# Patient Record
Sex: FEMALE | Race: WHITE | HISPANIC OR LATINO | Employment: FULL TIME | URBAN - METROPOLITAN AREA
[De-identification: names, ages, dates, MRNs, and addresses within clinical notes are randomized per-mention and may not be internally consistent; named-entity substitution may affect disease eponyms.]

---

## 2017-09-22 ENCOUNTER — OFFICE VISIT (OUTPATIENT)
Dept: FAMILY MEDICINE | Facility: CLINIC | Age: 27
End: 2017-09-22
Payer: COMMERCIAL

## 2017-09-22 VITALS
WEIGHT: 117 LBS | OXYGEN SATURATION: 95 % | SYSTOLIC BLOOD PRESSURE: 101 MMHG | BODY MASS INDEX: 21.93 KG/M2 | DIASTOLIC BLOOD PRESSURE: 62 MMHG | RESPIRATION RATE: 14 BRPM | HEART RATE: 86 BPM | TEMPERATURE: 98.7 F

## 2017-09-22 DIAGNOSIS — L30.9 DERMATITIS: Primary | ICD-10-CM

## 2017-09-22 DIAGNOSIS — Z23 NEED FOR PROPHYLACTIC VACCINATION AND INOCULATION AGAINST INFLUENZA: ICD-10-CM

## 2017-09-22 PROCEDURE — 90471 IMMUNIZATION ADMIN: CPT | Performed by: FAMILY MEDICINE

## 2017-09-22 PROCEDURE — 99213 OFFICE O/P EST LOW 20 MIN: CPT | Mod: 25 | Performed by: FAMILY MEDICINE

## 2017-09-22 PROCEDURE — 90686 IIV4 VACC NO PRSV 0.5 ML IM: CPT | Performed by: FAMILY MEDICINE

## 2017-09-22 RX ORDER — CLOBETASOL PROPIONATE 0.5 MG/G
AEROSOL, FOAM TOPICAL
Qty: 100 G | Refills: 1 | Status: SHIPPED | OUTPATIENT
Start: 2017-09-22 | End: 2018-10-12

## 2017-09-22 RX ORDER — CLOBETASOL PROPIONATE 0.05 G/100ML
SHAMPOO TOPICAL DAILY PRN
Qty: 1 BOTTLE | Refills: 1 | Status: SHIPPED | OUTPATIENT
Start: 2017-09-22 | End: 2019-10-21

## 2017-09-22 NOTE — PROGRESS NOTES
SUBJECTIVE:   Shanta Edmondson is a 26 year old female who presents to clinic today for the following health issues:    Dry scalp  She's had many years of dandruff, so has used head and shoulders shampoo for years, then had much worse symptoms starting one year ago after treating herself with OVER THE COUNTER lice treatment consisting of 45 min cream treatement, and shampoo follow up, that seemed to work, but since then she has had recurrent red spots of her scalp.    She's tried different shampoos (neurogena), treating with tea tree oil.    Smptoms worsening over past few months, associated with very itchy scalp.      Problem list and histories reviewed & adjusted, as indicated.  Additional history: as documented    Patient Active Problem List   Diagnosis     CARDIOVASCULAR SCREENING; LDL GOAL LESS THAN 160     Gastroesophageal reflux disease without esophagitis     Restless leg syndrome     Past Surgical History:   Procedure Laterality Date     NO HISTORY OF SURGERY         Social History   Substance Use Topics     Smoking status: Never Smoker     Smokeless tobacco: Never Used     Alcohol use No      Comment: once a year      Family History   Problem Relation Age of Onset     DIABETES Father      Lipids Father      Hypertension Maternal Grandmother      DIABETES Paternal Grandfather          No Known Allergies  BP Readings from Last 3 Encounters:   09/22/17 101/62   11/04/16 94/71   09/29/15 104/70    Wt Readings from Last 3 Encounters:   09/22/17 117 lb (53.1 kg)   11/04/16 115 lb 8 oz (52.4 kg)   09/29/15 112 lb (50.8 kg)            Reviewed and updated as needed this visit by clinical staffTobacco  Allergies  Meds  Med Hx  Surg Hx  Fam Hx  Soc Hx      Reviewed and updated as needed this visit by Provider         ROS:  Constitutional, HEENT, cardiovascular, pulmonary, gi and gu systems are negative, except as otherwise noted.      OBJECTIVE:   /62  Pulse 86  Temp 98.7  F (37.1  C)  (Tympanic)  Resp 14  Wt 117 lb (53.1 kg)  LMP 09/15/2017  SpO2 95%  BMI 21.93 kg/m2  Body mass index is 21.93 kg/(m^2).  Patient is alert, cooperative, pleasant, in no acute distress.   Head: normocephalic, normal hair with normal distribution. Dry, scaly, slightly erythematous patches noted at hair line of forehead, behind ears, within hairline, with normal hair follicles and hair growth.    Diagnostic Test Results:  none     ASSESSMENT/PLAN:     1. Dermatitis  See orders, may use whatever is least expensive  Return to clinic if symptoms worsen or progress.     - clobetasol propionate 0.05 % SHAM; Apply topically daily as needed Apply sparingly to dry scalp once daily for 2 weeks, leave in place for 15 min, then add water, lather, rinse thoroughly.  Dispense: 1 Bottle; Refill: 1  - clobetasol propionate 0.05 % FOAM; Apply sparingly to scalp once daily for two weeks. Leave overnight and shower normally in the morning.  Do not apply to face.  Dispense: 100 g; Refill: 1    2. Need for prophylactic vaccination and inoculation against influenza  Done today  - FLU VAC, SPLIT VIRUS IM > 3 YO (QUADRIVALENT) [04676]  - Vaccine Administration, Initial [13712]    Deepika Peng MD  [facility]    Injectable Influenza Immunization Documentation    1.  Is the person to be vaccinated sick today?   No    2. Does the person to be vaccinated have an allergy to a component   of the vaccine?   No    3. Has the person to be vaccinated ever had a serious reaction   to influenza vaccine in the past?   No    4. Has the person to be vaccinated ever had Guillain-Barré syndrome?   No    Form completed by Dora Leal MA

## 2017-09-22 NOTE — MR AVS SNAPSHOT
After Visit Summary   9/22/2017    Shanta Edmondson    MRN: [id number]           Patient Information     Date Of Birth          1990        Visit Information        Provider Department      9/22/2017 4:00 PM Deepika Peng MD [facility]        Today's Diagnoses     Dermatitis    -  1    Need for prophylactic vaccination and inoculation against influenza           Follow-ups after your visit        Who to contact     If you have questions or need follow up information about today's clinic visit or your schedule please contact [facility] directly at [phone].  Normal or non-critical lab and imaging results will be communicated to you by MyChart, letter or phone within 4 business days after the clinic has received the results. If you do not hear from us within 7 days, please contact the clinic through [employer] or phone. If you have a critical or abnormal lab result, we will notify you by phone as soon as possible.  Submit refill requests through [employer] or call your pharmacy and they will forward the refill request to us. Please allow 3 business days for your refill to be completed.          Additional Information About Your Visit        MyChart Information     [employer] gives you secure access to your electronic health record. If you see a primary care provider, you can also send messages to your care team and make appointments. If you have questions, please call your primary care clinic.  If you do not have a primary care provider, please call [phone] and they will assist you.        Care EveryWhere ID     This is your Care EveryWhere ID. This could be used by other organizations to access your [place] medical records  [id number]        Your Vitals Were     Pulse Temperature Respirations Last Period Pulse Oximetry BMI (Body Mass Index)    86 98.7  F (37.1  C) (Tympanic) 14 09/15/2017 95% 21.93 kg/m2       Blood Pressure from Last 3  Encounters:   09/22/17 101/62   11/04/16 94/71   09/29/15 104/70    Weight from Last 3 Encounters:   09/22/17 117 lb (53.1 kg)   11/04/16 115 lb 8 oz (52.4 kg)   09/29/15 112 lb (50.8 kg)              We Performed the Following     FLU VAC, SPLIT VIRUS IM > 3 YO (QUADRIVALENT) [86760]     Vaccine Administration, Initial [98168]          Today's Medication Changes          These changes are accurate as of: 9/22/17 11:59 PM.  If you have any questions, ask your nurse or doctor.               Start taking these medicines.        Dose/Directions    * clobetasol propionate 0.05 % Sham   Used for:  Dermatitis   Started by:  Deepika Peng MD        Apply topically daily as needed Apply sparingly to dry scalp once daily for 2 weeks, leave in place for 15 min, then add water, lather, rinse thoroughly.   Quantity:  1 Bottle   Refills:  1       * clobetasol propionate 0.05 % Foam   Used for:  Dermatitis   Started by:  Deepika Peng MD        Apply sparingly to scalp once daily for two weeks. Leave overnight and shower normally in the morning.  Do not apply to face.   Quantity:  100 g   Refills:  1       * Notice:  This list has 2 medication(s) that are the same as other medications prescribed for you. Read the directions carefully, and ask your doctor or other care provider to review them with you.         Where to get your medicines      Some of these will need a paper prescription and others can be bought over the counter.  Ask your nurse if you have questions.     Bring a paper prescription for each of these medications     clobetasol propionate 0.05 % Foam    clobetasol propionate 0.05 % Sham                Primary Care Provider Office Phone # Fax #    Laney Todd PA-C [phone] [address]  [facility] [address]        Equal Access to Services     SUSANNE FORD AH: Valentino Wall, claus waller, sylvia mace  kuldip grantronytameka younger'aajaxson ah. So [facility] [phone].    ATENCIÓN: Si cameron duarte, tiene a vera disposición servicios gratuitos de asistencia lingüística. Maria G al [phone].    We comply with applicable federal civil rights laws and [place] laws. We do not discriminate on the basis of race, color, national origin, age, disability sex, sexual orientation or gender identity.            Thank you!     Thank you for choosing [facility]  for your care. Our goal is always to provide you with excellent care. Hearing back from our patients is one way we can continue to improve our services. Please take a few minutes to complete the written survey that you may receive in the mail after your visit with us. Thank you!             Your Updated Medication List - Protect others around you: Learn how to safely use, store and throw away your medicines at www.disposemymeds.org.          This list is accurate as of: 9/22/17 11:59 PM.  Always use your most recent med list.                   Brand Name Dispense Instructions for use Diagnosis    * clobetasol propionate 0.05 % Sham     1 Bottle    Apply topically daily as needed Apply sparingly to dry scalp once daily for 2 weeks, leave in place for 15 min, then add water, lather, rinse thoroughly.    Dermatitis       * clobetasol propionate 0.05 % Foam     100 g    Apply sparingly to scalp once daily for two weeks. Leave overnight and shower normally in the morning.  Do not apply to face.    Dermatitis       magnesium 250 MG tablet     100 tablet    Take 1 tablet by mouth daily    Restless leg syndrome       MULTIVITAMIN ADULT PO           omeprazole 20 MG tablet     60 tablet    Take 1 tablet (20 mg) by mouth daily Take 30-60 minutes before a meal and wait 30 minutes before eating after taking the medication.    Gastroesophageal reflux disease without esophagitis       * Notice:  This list has 2 medication(s) that are the same as other medications prescribed for you. Read  the directions carefully, and ask your doctor or other care provider to review them with you.

## 2017-09-22 NOTE — NURSING NOTE
Injectable Influenza Immunization Documentation    1.  Is the person to be vaccinated sick today?   No    2. Does the person to be vaccinated have an allergy to a component   of the vaccine?   No    3. Has the person to be vaccinated ever had a serious reaction   to influenza vaccine in the past?   No    4. Has the person to be vaccinated ever had Guillain-Barré syndrome?   No    Form completed by Dora Leal MA

## 2017-09-22 NOTE — NURSING NOTE
"Chief Complaint   Patient presents with     Derm Problem       Initial /62  Pulse 86  Temp 98.7  F (37.1  C) (Tympanic)  Resp 14  Wt 117 lb (53.1 kg)  LMP 09/15/2017  SpO2 95%  BMI 21.93 kg/m2 Estimated body mass index is 21.93 kg/(m^2) as calculated from the following:    Height as of 11/4/16: 5' 1.25\" (1.556 m).    Weight as of this encounter: 117 lb (53.1 kg).  Medication Reconciliation: complete     Dora Leal MA    "

## 2018-04-24 ENCOUNTER — OFFICE VISIT (OUTPATIENT)
Dept: DERMATOLOGY | Facility: CLINIC | Age: 28
End: 2018-04-24
Payer: COMMERCIAL

## 2018-04-24 VITALS — DIASTOLIC BLOOD PRESSURE: 76 MMHG | OXYGEN SATURATION: 98 % | HEART RATE: 90 BPM | SYSTOLIC BLOOD PRESSURE: 109 MMHG

## 2018-04-24 DIAGNOSIS — L70.0 ACNE VULGARIS: Primary | ICD-10-CM

## 2018-04-24 DIAGNOSIS — L21.9 DERMATITIS, SEBORRHEIC: ICD-10-CM

## 2018-04-24 PROCEDURE — 99214 OFFICE O/P EST MOD 30 MIN: CPT | Performed by: PHYSICIAN ASSISTANT

## 2018-04-24 RX ORDER — KETOCONAZOLE 20 MG/ML
SHAMPOO TOPICAL DAILY PRN
Qty: 120 ML | Refills: 11 | Status: SHIPPED | OUTPATIENT
Start: 2018-04-24 | End: 2019-10-21

## 2018-04-24 RX ORDER — TRETINOIN 0.5 MG/G
CREAM TOPICAL
Qty: 45 G | Refills: 11 | Status: SHIPPED | OUTPATIENT
Start: 2018-04-24 | End: 2019-10-21

## 2018-04-24 RX ORDER — DOXYCYCLINE 100 MG/1
CAPSULE ORAL
Qty: 60 CAPSULE | Refills: 2 | Status: SHIPPED | OUTPATIENT
Start: 2018-04-24 | End: 2018-10-12

## 2018-04-24 NOTE — LETTER
4/24/2018         RE: Shanta Edmondson  [address] [facility] [address]        Dear Colleague,    Thank you for referring your patient, Shanta Edmondson, to the [facility]. Please see a copy of my visit note below.    HPI:   Shanta Edmondson is a 27 year old female who presents for acne.  chief complaint  Condition has been present for: as long as she can remember, but worsening within the last several months. Seems to be present at all times now.   Pt complains of pain: Yes - cystic acne  Flares with menses.   Previous treatments include: Neutrogena products   Areas Involved: face  Current Outpatient Prescriptions   Medication Sig Dispense Refill     vitamin B complex with vitamin C (VITAMIN  B COMPLEX) TABS tablet Take 1 tablet by mouth daily       clobetasol propionate 0.05 % FOAM Apply sparingly to scalp once daily for two weeks. Leave overnight and shower normally in the morning.  Do not apply to face. (Patient not taking: Reported on 4/24/2018) 100 g 1     clobetasol propionate 0.05 % SHAM Apply topically daily as needed Apply sparingly to dry scalp once daily for 2 weeks, leave in place for 15 min, then add water, lather, rinse thoroughly. (Patient not taking: Reported on 4/24/2018) 1 Bottle 1     magnesium 250 MG tablet Take 1 tablet by mouth daily (Patient not taking: Reported on 9/22/2017) 100 tablet 3     Multiple Vitamins-Minerals (MULTIVITAMIN ADULT PO)        omeprazole 20 MG tablet Take 1 tablet (20 mg) by mouth daily Take 30-60 minutes before a meal and wait 30 minutes before eating after taking the medication. (Patient not taking: Reported on 9/22/2017) 60 tablet 1     No Known Allergies  Denies any other skin complaints, in general feels well: Yes  Review of symptoms otherwise negative:Yes    Social history: Lives in [place]. She works at [employer].     This document serves as a record of the services and decisions  personally performed and made by Radha Corona, MS, PATamraC. It was created on her behalf by Gina Bernstein, a trained medical scribe. The creation of this document is based on the provider's statements to the medical scribe.  Gina Bernstein 3:20 PM April 24, 2018    PHYSICAL EXAM:   A&Ox3: Yes   Well developed/well nourished female Yes   Mood appropriate Yes      /76  Pulse 90  LMP 04/10/2018  SpO2 98%  Breastfeeding? No  Type 3 skin. Mood appropriate  Alert and Oriented X 3. Well developed, well nourished in no distress.  General appearance: Normal  Head including face: Normal  Eyes: conjunctiva and lids: Normal  Mouth: Lips, teeth, gums: Normal  Neck: Normal  Back: clear  Cardiovascular: Exam of peripheral vascular system by observation for swelling, varicosities, edema: Normal  Extremities: digits/nails (clubbing): Normal  Right upper extremity: Normal  Left upper extremity: Normal  Right lower extremity: Normal  Left lower extremity: Normal  Skin: Scalp and body hair: See below     Comedones Papules/Pustules Cysts Staining Scarring   Face/Neck 2+ 2+ jawline 0 1+ 0   Chest 0 0 0 0 0   Back 0 0 0 0 0     Telangiectasias: No Fixed Erythema: No Exoriations: No   Other Physical Exam Findings:    ASSESSMENT & PLAN:   1. Acne Vulgaris - advised on diagnosis and treatment options. Tends to have combination skin. Discussed use of topical medications, antibiotics, OCP. Using Neutrogena acne scrub and lotion currently.   --Start doxycycline 100 mg BID x 3 months. Advised to take with food. Discussed risk of GI upset, esophagitis and photosensitivity.    --Start tretinoin 0.05% cream QD   --Gentle cleanser and moisturizer   2. Seborrheic dermatitis of scalp - advised on chronic, recurrent condition. Discussed that it is a reaction to the normal yeast on the skin. Has tried clobetasol shampoo in the past.   --Start ketoconazole shampoo daily as needed for itching and irritation    Pt advised on use and risks  including photosensitivity, allergic reactions, GI upset, headaches, nausea, erythema, scaling, vertigo, asthralgias, blood clots:Yes    Follow-up: 2 months  CC:   Scribed By: Gina Bernstein Medical Scribe    The information in this document, created by the medical scribe for me, accurately reflects the services I personally performed and the decisions made by me. I have reviewed and approved this document for accuracy prior to leaving the patient care area.  April 24, 2018 3:37 PM    Radha Corona MS, PATamraC        Again, thank you for allowing me to participate in the care of your patient.        Sincerely,        Radha Corona PA-C

## 2018-04-24 NOTE — NURSING NOTE
"Chief Complaint   Patient presents with     Acne       Initial /76  Pulse 90  LMP 04/10/2018  SpO2 98%  Breastfeeding? No Estimated body mass index is 21.93 kg/(m^2) as calculated from the following:    Height as of 11/4/16: 1.556 m (5' 1.25\").    Weight as of 9/22/17: 53.1 kg (117 lb).  Medication Reconciliation: complete    "

## 2018-04-24 NOTE — PATIENT INSTRUCTIONS
For acne:  Wash face with gentle cleanser one to two times daily -- Cetaphil, CeraVe, Neutrogena, Vanicream     Apply pea sized amount of tretinoin 0.05% cream to entire face once daily     Over tretinoin cream, apply moisturizer -- Cetaphil, CeraVe, Neutrogena, Vanicream     Start doxycycline 100 mg twice daily with food and water x 3 months.     For scalp:  Start ketoconazole shampoo daily as needed for itching and irritation       Follow up in 2 months for acne recheck

## 2018-04-24 NOTE — PROGRESS NOTES
HPI:   Shanta Edmondson is a 27 year old female who presents for acne.  chief complaint  Condition has been present for: as long as she can remember, but worsening within the last several months. Seems to be present at all times now.   Pt complains of pain: Yes - cystic acne  Flares with menses.   Previous treatments include: Neutrogena products   Areas Involved: face  Current Outpatient Prescriptions   Medication Sig Dispense Refill     vitamin B complex with vitamin C (VITAMIN  B COMPLEX) TABS tablet Take 1 tablet by mouth daily       clobetasol propionate 0.05 % FOAM Apply sparingly to scalp once daily for two weeks. Leave overnight and shower normally in the morning.  Do not apply to face. (Patient not taking: Reported on 4/24/2018) 100 g 1     clobetasol propionate 0.05 % SHAM Apply topically daily as needed Apply sparingly to dry scalp once daily for 2 weeks, leave in place for 15 min, then add water, lather, rinse thoroughly. (Patient not taking: Reported on 4/24/2018) 1 Bottle 1     magnesium 250 MG tablet Take 1 tablet by mouth daily (Patient not taking: Reported on 9/22/2017) 100 tablet 3     Multiple Vitamins-Minerals (MULTIVITAMIN ADULT PO)        omeprazole 20 MG tablet Take 1 tablet (20 mg) by mouth daily Take 30-60 minutes before a meal and wait 30 minutes before eating after taking the medication. (Patient not taking: Reported on 9/22/2017) 60 tablet 1     No Known Allergies  Denies any other skin complaints, in general feels well: Yes  Review of symptoms otherwise negative:Yes    Social history: Lives in [place]. She works at [employer].     This document serves as a record of the services and decisions personally performed and made by Radha Corona, MS, PA-C. It was created on her behalf by Gina Bernstein, a trained medical scribe. The creation of this document is based on the provider's statements to the medical scribe.  Gina Bernstein 3:20 PM April 24,  2018    PHYSICAL EXAM:   A&Ox3: Yes   Well developed/well nourished female Yes   Mood appropriate Yes      /76  Pulse 90  LMP 04/10/2018  SpO2 98%  Breastfeeding? No  Type 3 skin. Mood appropriate  Alert and Oriented X 3. Well developed, well nourished in no distress.  General appearance: Normal  Head including face: Normal  Eyes: conjunctiva and lids: Normal  Mouth: Lips, teeth, gums: Normal  Neck: Normal  Back: clear  Cardiovascular: Exam of peripheral vascular system by observation for swelling, varicosities, edema: Normal  Extremities: digits/nails (clubbing): Normal  Right upper extremity: Normal  Left upper extremity: Normal  Right lower extremity: Normal  Left lower extremity: Normal  Skin: Scalp and body hair: See below     Comedones Papules/Pustules Cysts Staining Scarring   Face/Neck 2+ 2+ jawline 0 1+ 0   Chest 0 0 0 0 0   Back 0 0 0 0 0     Telangiectasias: No Fixed Erythema: No Exoriations: No   Other Physical Exam Findings:    ASSESSMENT & PLAN:   1. Acne Vulgaris - advised on diagnosis and treatment options. Tends to have combination skin. Discussed use of topical medications, antibiotics, OCP. Using Neutrogena acne scrub and lotion currently.   --Start doxycycline 100 mg BID x 3 months. Advised to take with food. Discussed risk of GI upset, esophagitis and photosensitivity.    --Start tretinoin 0.05% cream QD   --Gentle cleanser and moisturizer   2. Seborrheic dermatitis of scalp - advised on chronic, recurrent condition. Discussed that it is a reaction to the normal yeast on the skin. Has tried clobetasol shampoo in the past.   --Start ketoconazole shampoo daily as needed for itching and irritation    Pt advised on use and risks including photosensitivity, allergic reactions, GI upset, headaches, nausea, erythema, scaling, vertigo, asthralgias, blood clots:Yes    Follow-up: 2 months  CC:   Scribed By: Gilda Velasco Scribe    The information in this document, created by the  medical scribe for me, accurately reflects the services I personally performed and the decisions made by me. I have reviewed and approved this document for accuracy prior to leaving the patient care area.  April 24, 2018 3:37 PM    Radha Corona MS, PA-C

## 2018-04-24 NOTE — MR AVS SNAPSHOT
After Visit Summary   4/24/2018    Shanta Edmondson    MRN: [id number]           Patient Information     Date Of Birth          1990        Visit Information        Provider Department      4/24/2018 3:15 PM Radha Corona PA-C [facility]        Today's Diagnoses     Acne vulgaris    -  1    Dermatitis, seborrheic          Care Instructions    For acne:  Wash face with gentle cleanser one to two times daily -- Cetaphil, CeraVe, Neutrogena, Vanicream     Apply pea sized amount of tretinoin 0.05% cream to entire face once daily     Over tretinoin cream, apply moisturizer -- Cetaphil, CeraVe, Neutrogena, Vanicream     Start doxycycline 100 mg twice daily with food and water x 3 months.     For scalp:  Start ketoconazole shampoo daily as needed for itching and irritation       Follow up in 2 months for acne recheck            Follow-ups after your visit        Follow-up notes from your care team     Return in about 2 months (around 6/24/2018) for recheck on acne.      Who to contact     If you have questions or need follow up information about today's clinic visit or your schedule please contact [facility] directly at [phone].  Normal or non-critical lab and imaging results will be communicated to you by ExactFlathart, letter or phone within 4 business days after the clinic has received the results. If you do not hear from us within 7 days, please contact the clinic through ExactFlathart or phone. If you have a critical or abnormal lab result, we will notify you by phone as soon as possible.  Submit refill requests through Rennovia or call your pharmacy and they will forward the refill request to us. Please allow 3 business days for your refill to be completed.          Additional Information About Your Visit        MyChart Information     Rennovia gives you secure access to your electronic health record. If you see a primary care provider, you can  also send messages to your care team and make appointments. If you have questions, please call your primary care clinic.  If you do not have a primary care provider, please call [phone] and they will assist you.        Care EveryWhere ID     This is your Care EveryWhere ID. This could be used by other organizations to access your [place] medical records  [id number]        Your Vitals Were     Pulse Last Period Pulse Oximetry Breastfeeding?          90 04/10/2018 98% No         Blood Pressure from Last 3 Encounters:   04/24/18 109/76   09/22/17 101/62   11/04/16 94/71    Weight from Last 3 Encounters:   09/22/17 53.1 kg (117 lb)   11/04/16 52.4 kg (115 lb 8 oz)   09/29/15 50.8 kg (112 lb)              Today, you had the following     No orders found for display       Primary Care Provider Office Phone # Fax #    Laney Todd PA-C [phone]       [address] [place] [address]        Equal Access to Services     SUSANNE FORD : Hadii aad ku hadasho Soomaali, waaxda luqadaha, qaybta kaalmada adeegyada, waxay idiin hayaan kuldip palacios . So [facility] [phone].    ATENCIÓN: Si habla español, tiene a vera disposición servicios gratuitos de asistencia lingüística. [facility] [phone].    We comply with applicable federal civil rights laws and [place] laws. We do not discriminate on the basis of race, color, national origin, age, disability, sex, sexual orientation, or gender identity.            Thank you!     Thank you for choosing [facility]  for your care. Our goal is always to provide you with excellent care. Hearing back from our patients is one way we can continue to improve our services. Please take a few minutes to complete the written survey that you may receive in the mail after your visit with us. Thank you!             Your Updated Medication List - Protect others around you: Learn how to safely use, store and throw away your  medicines at www.disposemymeds.org.          This list is accurate as of 4/24/18  3:38 PM.  Always use your most recent med list.                   Brand Name Dispense Instructions for use Diagnosis    * clobetasol propionate 0.05 % Sham     1 Bottle    Apply topically daily as needed Apply sparingly to dry scalp once daily for 2 weeks, leave in place for 15 min, then add water, lather, rinse thoroughly.    Dermatitis       * clobetasol propionate 0.05 % Foam     100 g    Apply sparingly to scalp once daily for two weeks. Leave overnight and shower normally in the morning.  Do not apply to face.    Dermatitis       magnesium 250 MG tablet     100 tablet    Take 1 tablet by mouth daily    Restless leg syndrome       MULTIVITAMIN ADULT PO           omeprazole 20 MG tablet     60 tablet    Take 1 tablet (20 mg) by mouth daily Take 30-60 minutes before a meal and wait 30 minutes before eating after taking the medication.    Gastroesophageal reflux disease without esophagitis       vitamin B complex with vitamin C Tabs tablet      Take 1 tablet by mouth daily        * Notice:  This list has 2 medication(s) that are the same as other medications prescribed for you. Read the directions carefully, and ask your doctor or other care provider to review them with you.

## 2018-04-27 ENCOUNTER — TELEPHONE (OUTPATIENT)
Dept: DERMATOLOGY | Facility: CLINIC | Age: 28
End: 2018-04-27

## 2018-04-27 NOTE — TELEPHONE ENCOUNTER
Prior Authorization Retail Medication Request    Medication/Dose: Tretinoin 0.05% Cream 45GM  ICD code (if different than what is on RX):    Previously Tried and Failed:    Rationale:      Insurance Name:  [employer]  Insurance ID:  [id number]       Pharmacy Information (if different than what is on RX)  Name:    Phone: *

## 2018-05-01 NOTE — TELEPHONE ENCOUNTER
Prior Authorization Approval    Authorization Effective Date: 5/1/2018  Authorization Expiration Date: 5/1/2019  Medication: TRETINOIN-APPROVED  Approved Dose/Quantity:    Reference #: [id number]   Insurance Company: [employer] ([facility]) - Phone [phone] Fax [phone]  Expected CoPay: $85.19     CoPay Card Available:      Foundation Assistance Needed:    Which Pharmacy is filling the prescription (Not needed for infusion/clinic administered): [email or web address] [address] [place], [place] [address] AVE AT [address]  Pharmacy Notified: Yes  Patient Notified: Yes

## 2018-05-01 NOTE — TELEPHONE ENCOUNTER
Central Prior Authorization Team   Phone: [phone]    PA Initiation    Medication:   Insurance Company: [employer] ([facility]) - Phone [phone] Fax [phone]  Pharmacy Filling the Rx: [facility] [id number] [place] [address] [address] AT [address]  Filling Pharmacy Phone: [phone]  Filling Pharmacy Fax: [phone]  Start Date: 5/1/2018

## 2018-07-20 ENCOUNTER — OFFICE VISIT (OUTPATIENT)
Dept: DERMATOLOGY | Facility: CLINIC | Age: 28
End: 2018-07-20
Payer: COMMERCIAL

## 2018-07-20 VITALS — DIASTOLIC BLOOD PRESSURE: 75 MMHG | HEART RATE: 93 BPM | SYSTOLIC BLOOD PRESSURE: 103 MMHG | OXYGEN SATURATION: 97 %

## 2018-07-20 DIAGNOSIS — L70.0 ACNE VULGARIS: Primary | ICD-10-CM

## 2018-07-20 DIAGNOSIS — L21.9 DERMATITIS, SEBORRHEIC: ICD-10-CM

## 2018-07-20 PROCEDURE — 99212 OFFICE O/P EST SF 10 MIN: CPT | Performed by: PHYSICIAN ASSISTANT

## 2018-07-20 NOTE — PROGRESS NOTES
HPI:   Shanta Edmondson is a 27 year old female who presents for acne. Doing well on current regimen  chief complaint  Condition has been present for: as long as she can remember, but worsening within the last several months. Seems to be present at all times now.   Pt complains of pain: Yes - cystic acne  Flares with menses.   Previous treatments include: Neutrogena products   Areas Involved: face  Current Outpatient Prescriptions   Medication Sig Dispense Refill     doxycycline monohydrate 100 MG capsule 1 tab BID with food and water x 3 months 60 capsule 2     ketoconazole (NIZORAL) 2 % shampoo Apply topically daily as needed for itching or irritation 120 mL 11     tretinoin (RETIN-A) 0.05 % cream Spread a pea size amount into affected area topically at bedtime.  Use sunscreen SPF>20. 45 g 11     clobetasol propionate 0.05 % FOAM Apply sparingly to scalp once daily for two weeks. Leave overnight and shower normally in the morning.  Do not apply to face. (Patient not taking: Reported on 4/24/2018) 100 g 1     clobetasol propionate 0.05 % SHAM Apply topically daily as needed Apply sparingly to dry scalp once daily for 2 weeks, leave in place for 15 min, then add water, lather, rinse thoroughly. (Patient not taking: Reported on 4/24/2018) 1 Bottle 1     magnesium 250 MG tablet Take 1 tablet by mouth daily (Patient not taking: Reported on 9/22/2017) 100 tablet 3     Multiple Vitamins-Minerals (MULTIVITAMIN ADULT PO)        omeprazole 20 MG tablet Take 1 tablet (20 mg) by mouth daily Take 30-60 minutes before a meal and wait 30 minutes before eating after taking the medication. (Patient not taking: Reported on 9/22/2017) 60 tablet 1     vitamin B complex with vitamin C (VITAMIN  B COMPLEX) TABS tablet Take 1 tablet by mouth daily       No Known Allergies  Denies any other skin complaints, in general feels well: Yes  Review of symptoms otherwise negative:Yes    Social history: Lives in [facility]. She works  at [employer].     This document serves as a record of the services and decisions personally performed and made by Radha Corona, MS, PA-C. It was created on her behalf by Gina Bernstein, a trained medical scribe. The creation of this document is based on the provider's statements to the medical scribe.  Gina Bernstein 3:20 PM April 24, 2018    PHYSICAL EXAM:   A&Ox3: Yes   Well developed/well nourished female Yes   Mood appropriate Yes      /75  Pulse 93  LMP 07/14/2018  SpO2 97%  Breastfeeding? No  Type 3 skin. Mood appropriate  Alert and Oriented X 3. Well developed, well nourished in no distress.  General appearance: Normal  Head including face: Normal  Eyes: conjunctiva and lids: Normal  Mouth: Lips, teeth, gums: Normal  Neck: Normal  Back: clear  Cardiovascular: Exam of peripheral vascular system by observation for swelling, varicosities, edema: Normal  Extremities: digits/nails (clubbing): Normal  Right upper extremity: Normal  Left upper extremity: Normal  Right lower extremity: Normal  Left lower extremity: Normal  Skin: Scalp and body hair: See below     Comedones Papules/Pustules Cysts Staining Scarring   Face/Neck 0-1+ 0 0 2+ 0   Chest 0 0 0 0 0   Back 0 0 0 0 0     Telangiectasias: No Fixed Erythema: No Exoriations: No   Other Physical Exam Findings:    ASSESSMENT & PLAN:   1. Acne Vulgaris - Doing much better! PIH only today. Using tretinoin every day. advised on diagnosis and treatment options. Tends to have combination skin. Discussed use of topical medications, antibiotics, OCP. If she flares after stopping doxy, dicussed increasing to tretinoin 0.1, repeat abx, OCPs, accutane.    --Finish doxycycline then stop  --Continue tretinoin 0.05% cream QD   --Gentle cleanser and moisturizer QD  2. Seborrheic dermatitis of scalp - Improved. advised on chronic, recurrent condition. Discussed that it is a reaction to the normal yeast on the skin.  --Continue ketoconazole shampoo  daily as needed for itching and irritation    Pt advised on use and risks including photosensitivity, allergic reactions, GI upset, headaches, nausea, erythema, scaling, vertigo, asthralgias, blood clots:Yes    Follow-up: 2 months  CC:   Scribed By: Gina Bernstein Medical Scribe    The information in this document, created by the medical scribe for me, accurately reflects the services I personally performed and the decisions made by me. I have reviewed and approved this document for accuracy prior to leaving the patient care area.  April 24, 2018 3:37 PM    Radha Corona MS, PA-C

## 2018-07-20 NOTE — LETTER
7/20/2018         RE: Shanta Edmondson  [address] [facility] [address]        Dear Colleague,    Thank you for referring your patient, Shanta Edmondson, to the [facility]. Please see a copy of my visit note below.    HPI:   Shanta Edmondson is a 27 year old female who presents for acne. Doing well on current regimen  chief complaint  Condition has been present for: as long as she can remember, but worsening within the last several months. Seems to be present at all times now.   Pt complains of pain: Yes - cystic acne  Flares with menses.   Previous treatments include: Neutrogena products   Areas Involved: face  Current Outpatient Prescriptions   Medication Sig Dispense Refill     doxycycline monohydrate 100 MG capsule 1 tab BID with food and water x 3 months 60 capsule 2     ketoconazole (NIZORAL) 2 % shampoo Apply topically daily as needed for itching or irritation 120 mL 11     tretinoin (RETIN-A) 0.05 % cream Spread a pea size amount into affected area topically at bedtime.  Use sunscreen SPF>20. 45 g 11     clobetasol propionate 0.05 % FOAM Apply sparingly to scalp once daily for two weeks. Leave overnight and shower normally in the morning.  Do not apply to face. (Patient not taking: Reported on 4/24/2018) 100 g 1     clobetasol propionate 0.05 % SHAM Apply topically daily as needed Apply sparingly to dry scalp once daily for 2 weeks, leave in place for 15 min, then add water, lather, rinse thoroughly. (Patient not taking: Reported on 4/24/2018) 1 Bottle 1     magnesium 250 MG tablet Take 1 tablet by mouth daily (Patient not taking: Reported on 9/22/2017) 100 tablet 3     Multiple Vitamins-Minerals (MULTIVITAMIN ADULT PO)        omeprazole 20 MG tablet Take 1 tablet (20 mg) by mouth daily Take 30-60 minutes before a meal and wait 30 minutes before eating after taking the medication. (Patient not taking: Reported on 9/22/2017) 60 tablet 1      vitamin B complex with vitamin C (VITAMIN  B COMPLEX) TABS tablet Take 1 tablet by mouth daily       No Known Allergies  Denies any other skin complaints, in general feels well: Yes  Review of symptoms otherwise negative:Yes    Social history: Lives in [facility]. She works at [employer].     This document serves as a record of the services and decisions personally performed and made by Radha Corona, MS, PA-C. It was created on her behalf by Gina Bernstein, a trained medical scribe. The creation of this document is based on the provider's statements to the medical scribe.  Gina Bernstein 3:20 PM April 24, 2018    PHYSICAL EXAM:   A&Ox3: Yes   Well developed/well nourished female Yes   Mood appropriate Yes      /75  Pulse 93  LMP 07/14/2018  SpO2 97%  Breastfeeding? No  Type 3 skin. Mood appropriate  Alert and Oriented X 3. Well developed, well nourished in no distress.  General appearance: Normal  Head including face: Normal  Eyes: conjunctiva and lids: Normal  Mouth: Lips, teeth, gums: Normal  Neck: Normal  Back: clear  Cardiovascular: Exam of peripheral vascular system by observation for swelling, varicosities, edema: Normal  Extremities: digits/nails (clubbing): Normal  Right upper extremity: Normal  Left upper extremity: Normal  Right lower extremity: Normal  Left lower extremity: Normal  Skin: Scalp and body hair: See below     Comedones Papules/Pustules Cysts Staining Scarring   Face/Neck 0-1+ 0 0 2+ 0   Chest 0 0 0 0 0   Back 0 0 0 0 0     Telangiectasias: No Fixed Erythema: No Exoriations: No   Other Physical Exam Findings:    ASSESSMENT & PLAN:   1. Acne Vulgaris - Doing much better! PIH only today. Using tretinoin every day. advised on diagnosis and treatment options. Tends to have combination skin. Discussed use of topical medications, antibiotics, OCP. If she flares after stopping doxy, dicussed increasing to tretinoin 0.1, repeat abx, OCPs, accutane.    --Finish  doxycycline then stop  --Continue tretinoin 0.05% cream QD   --Gentle cleanser and moisturizer QD  2. Seborrheic dermatitis of scalp - Improved. advised on chronic, recurrent condition. Discussed that it is a reaction to the normal yeast on the skin.  --Continue ketoconazole shampoo daily as needed for itching and irritation    Pt advised on use and risks including photosensitivity, allergic reactions, GI upset, headaches, nausea, erythema, scaling, vertigo, asthralgias, blood clots:Yes    Follow-up: 2 months  CC:   Scribed By: Gina Bernstein Medical Scribe    The information in this document, created by the medical scribe for me, accurately reflects the services I personally performed and the decisions made by me. I have reviewed and approved this document for accuracy prior to leaving the patient care area.  April 24, 2018 3:37 PM    Radha Corona MS, ALEX        Again, thank you for allowing me to participate in the care of your patient.        Sincerely,        Radha Corona PA-C

## 2018-10-12 ENCOUNTER — RADIANT APPOINTMENT (OUTPATIENT)
Dept: GENERAL RADIOLOGY | Facility: CLINIC | Age: 28
End: 2018-10-12
Attending: PHYSICIAN ASSISTANT
Payer: COMMERCIAL

## 2018-10-12 ENCOUNTER — OFFICE VISIT (OUTPATIENT)
Dept: FAMILY MEDICINE | Facility: CLINIC | Age: 28
End: 2018-10-12
Payer: COMMERCIAL

## 2018-10-12 VITALS
RESPIRATION RATE: 18 BRPM | OXYGEN SATURATION: 99 % | SYSTOLIC BLOOD PRESSURE: 113 MMHG | DIASTOLIC BLOOD PRESSURE: 76 MMHG | TEMPERATURE: 98.7 F | HEIGHT: 62 IN | BODY MASS INDEX: 23.37 KG/M2 | WEIGHT: 127 LBS | HEART RATE: 83 BPM

## 2018-10-12 DIAGNOSIS — R10.84 ABDOMINAL PAIN, GENERALIZED: ICD-10-CM

## 2018-10-12 DIAGNOSIS — K59.00 CONSTIPATION, UNSPECIFIED CONSTIPATION TYPE: ICD-10-CM

## 2018-10-12 DIAGNOSIS — Z23 NEED FOR PROPHYLACTIC VACCINATION AND INOCULATION AGAINST INFLUENZA: ICD-10-CM

## 2018-10-12 DIAGNOSIS — K21.9 GASTROESOPHAGEAL REFLUX DISEASE WITHOUT ESOPHAGITIS: ICD-10-CM

## 2018-10-12 DIAGNOSIS — Z00.00 ROUTINE GENERAL MEDICAL EXAMINATION AT A HEALTH CARE FACILITY: Primary | ICD-10-CM

## 2018-10-12 DIAGNOSIS — Z13.6 CARDIOVASCULAR SCREENING; LDL GOAL LESS THAN 160: ICD-10-CM

## 2018-10-12 LAB
ALBUMIN SERPL-MCNC: 4.1 G/DL (ref 3.4–5)
ALP SERPL-CCNC: 77 U/L (ref 40–150)
ALT SERPL W P-5'-P-CCNC: 14 U/L (ref 0–50)
ANION GAP SERPL CALCULATED.3IONS-SCNC: 7 MMOL/L (ref 3–14)
AST SERPL W P-5'-P-CCNC: 19 U/L (ref 0–45)
BASOPHILS # BLD AUTO: 0 10E9/L (ref 0–0.2)
BASOPHILS NFR BLD AUTO: 0.6 %
BILIRUB SERPL-MCNC: 0.6 MG/DL (ref 0.2–1.3)
BUN SERPL-MCNC: 9 MG/DL (ref 7–30)
CALCIUM SERPL-MCNC: 9.3 MG/DL (ref 8.5–10.1)
CHLORIDE SERPL-SCNC: 106 MMOL/L (ref 94–109)
CHOLEST SERPL-MCNC: 122 MG/DL
CO2 SERPL-SCNC: 26 MMOL/L (ref 20–32)
CREAT SERPL-MCNC: 0.55 MG/DL (ref 0.52–1.04)
DIFFERENTIAL METHOD BLD: NORMAL
EOSINOPHIL # BLD AUTO: 0.1 10E9/L (ref 0–0.7)
EOSINOPHIL NFR BLD AUTO: 0.9 %
ERYTHROCYTE [DISTWIDTH] IN BLOOD BY AUTOMATED COUNT: 13.4 % (ref 10–15)
GFR SERPL CREATININE-BSD FRML MDRD: >90 ML/MIN/1.7M2
GLUCOSE SERPL-MCNC: 76 MG/DL (ref 70–99)
HCT VFR BLD AUTO: 44 % (ref 35–47)
HDLC SERPL-MCNC: 47 MG/DL
HGB BLD-MCNC: 14.5 G/DL (ref 11.7–15.7)
LDLC SERPL CALC-MCNC: 64 MG/DL
LYMPHOCYTES # BLD AUTO: 1.7 10E9/L (ref 0.8–5.3)
LYMPHOCYTES NFR BLD AUTO: 31.1 %
MCH RBC QN AUTO: 30.7 PG (ref 26.5–33)
MCHC RBC AUTO-ENTMCNC: 33 G/DL (ref 31.5–36.5)
MCV RBC AUTO: 93 FL (ref 78–100)
MONOCYTES # BLD AUTO: 0.4 10E9/L (ref 0–1.3)
MONOCYTES NFR BLD AUTO: 7.4 %
NEUTROPHILS # BLD AUTO: 3.3 10E9/L (ref 1.6–8.3)
NEUTROPHILS NFR BLD AUTO: 60 %
NONHDLC SERPL-MCNC: 75 MG/DL
PLATELET # BLD AUTO: 268 10E9/L (ref 150–450)
POTASSIUM SERPL-SCNC: 3.8 MMOL/L (ref 3.4–5.3)
PROT SERPL-MCNC: 7.8 G/DL (ref 6.8–8.8)
RBC # BLD AUTO: 4.72 10E12/L (ref 3.8–5.2)
SODIUM SERPL-SCNC: 139 MMOL/L (ref 133–144)
TRIGL SERPL-MCNC: 56 MG/DL
TSH SERPL DL<=0.005 MIU/L-ACNC: 0.65 MU/L (ref 0.4–4)
WBC # BLD AUTO: 5.4 10E9/L (ref 4–11)

## 2018-10-12 PROCEDURE — 90686 IIV4 VACC NO PRSV 0.5 ML IM: CPT | Performed by: PHYSICIAN ASSISTANT

## 2018-10-12 PROCEDURE — 90471 IMMUNIZATION ADMIN: CPT | Performed by: PHYSICIAN ASSISTANT

## 2018-10-12 PROCEDURE — 74019 RADEX ABDOMEN 2 VIEWS: CPT

## 2018-10-12 PROCEDURE — 99395 PREV VISIT EST AGE 18-39: CPT | Mod: 25 | Performed by: PHYSICIAN ASSISTANT

## 2018-10-12 PROCEDURE — 84443 ASSAY THYROID STIM HORMONE: CPT | Performed by: PHYSICIAN ASSISTANT

## 2018-10-12 PROCEDURE — 84403 ASSAY OF TOTAL TESTOSTERONE: CPT | Performed by: PHYSICIAN ASSISTANT

## 2018-10-12 PROCEDURE — 36415 COLL VENOUS BLD VENIPUNCTURE: CPT | Performed by: PHYSICIAN ASSISTANT

## 2018-10-12 PROCEDURE — 85025 COMPLETE CBC W/AUTO DIFF WBC: CPT | Performed by: PHYSICIAN ASSISTANT

## 2018-10-12 PROCEDURE — 80061 LIPID PANEL: CPT | Performed by: PHYSICIAN ASSISTANT

## 2018-10-12 PROCEDURE — 80053 COMPREHEN METABOLIC PANEL: CPT | Performed by: PHYSICIAN ASSISTANT

## 2018-10-12 ASSESSMENT — ENCOUNTER SYMPTOMS
CONSTIPATION: 1
HEARTBURN: 1
ABDOMINAL PAIN: 1
WEAKNESS: 1

## 2018-10-12 NOTE — MR AVS SNAPSHOT
After Visit Summary   10/12/2018    Shanta Edmondson    MRN: [id number]           Patient Information     Date Of Birth          1990        Visit Information        Provider Department      10/12/2018 9:00 AM Laney Todd PA-C [facility]        Today's Diagnoses     Routine general medical examination at a health care facility    -  1    Constipation, unspecified constipation type        Abdominal pain, generalized        Gastroesophageal reflux disease without esophagitis        CARDIOVASCULAR SCREENING; LDL GOAL LESS THAN 160          Care Instructions    Trial of magnesium 200-400 mg nightly for the next few weeks.  If no improvement with above, prescription for Zantac 150 mg two times a day printed as next step.  Labs and stomach xray updated today.      Preventive Health Recommendations  Female Ages 21 to 25     Yearly exam:     See your health care provider every year in order to  o Review health changes.   o Discuss preventive care.    o Review your medicines if your doctor has prescribed any.      You should be tested each year for STDs (sexually transmitted diseases).       Talk to your provider about how often you should have cholesterol testing.      Get a Pap test every three years. If you have an abnormal result, your doctor may have you test more often.      If you are at risk for diabetes, you should have a diabetes test (fasting glucose).     Shots:     Get a flu shot each year.     Get a tetanus shot every 10 years.     Consider getting the shot (vaccine) that prevents cervical cancer (Gardasil).    Nutrition:     Eat at least 5 servings of fruits and vegetables each day.    Eat whole-grain bread, whole-wheat pasta and brown rice instead of white grains and rice.    Get adequate Calcium and Vitamin D.     Lifestyle    Exercise at least 150 minutes a week each week (30 minutes a day, 5 days a week). This will help you control your weight and  "prevent disease.    Limit alcohol to one drink per day.    No smoking.     Wear sunscreen to prevent skin cancer.    See your dentist every six months for an exam and cleaning.          Follow-ups after your visit        Follow-up notes from your care team     Return if symptoms worsen or fail to improve.      Future tests that were ordered for you today     Open Future Orders        Priority Expected Expires Ordered    XR Abdomen 2 Views Routine 10/12/2018 10/12/2019 10/12/2018            Who to contact     If you have questions or need follow up information about today's clinic visit or your schedule please contact [facility] directly at [phone].  Normal or non-critical lab and imaging results will be communicated to you by XO1hart, letter or phone within 4 business days after the clinic has received the results. If you do not hear from us within 7 days, please contact the clinic through XO1hart or phone. If you have a critical or abnormal lab result, we will notify you by phone as soon as possible.  Submit refill requests through Encision or call your pharmacy and they will forward the refill request to us. Please allow 3 business days for your refill to be completed.          Additional Information About Your Visit        XO1hart Information     [employer] gives you secure access to your electronic health record. If you see a primary care provider, you can also send messages to your care team and make appointments. If you have questions, please call your primary care clinic.  If you do not have a primary care provider, please call [phone] and they will assist you.        Care EveryWhere ID     This is your Care EveryWhere ID. This could be used by other organizations to access your [place] medical records  [id number]        Your Vitals Were     Pulse Temperature Respirations Height Last Period Pulse Oximetry    83 98.7  F (37.1  C) (Oral) 18 5' 1.5\" (1.562 m) 09/28/2018 99%    BMI (Body " Mass Index)                   23.61 kg/m2            Blood Pressure from Last 3 Encounters:   10/12/18 113/76   07/20/18 103/75   04/24/18 109/76    Weight from Last 3 Encounters:   10/12/18 127 lb (57.6 kg)   09/22/17 117 lb (53.1 kg)   11/04/16 115 lb 8 oz (52.4 kg)              We Performed the Following     CBC with platelets differential     Comprehensive metabolic panel     Lipid panel reflex to direct LDL Fasting     Testosterone, total     TSH with free T4 reflex          Today's Medication Changes          These changes are accurate as of 10/12/18  9:22 AM.  If you have any questions, ask your nurse or doctor.               Start taking these medicines.        Dose/Directions    ranitidine 150 MG tablet   Commonly known as:  ZANTAC   Used for:  Gastroesophageal reflux disease without esophagitis   Started by:  Laney Todd PA-C        Dose:  150 mg   Take 1 tablet (150 mg) by mouth 2 times daily   Quantity:  60 tablet   Refills:  1         These medicines have changed or have updated prescriptions.        Dose/Directions    clobetasol propionate 0.05 % Sham   This may have changed:  Another medication with the same name was removed. Continue taking this medication, and follow the directions you see here.   Used for:  Dermatitis   Changed by:  Laney Todd PA-C        Apply topically daily as needed Apply sparingly to dry scalp once daily for 2 weeks, leave in place for 15 min, then add water, lather, rinse thoroughly.   Quantity:  1 Bottle   Refills:  1         Stop taking these medicines if you haven't already. Please contact your care team if you have questions.     doxycycline monohydrate 100 MG capsule   Stopped by:  Laney Todd PA-C           magnesium 250 MG tablet   Stopped by:  Laney Todd PA-C           MULTIVITAMIN ADULT PO   Stopped by:  Laney Todd PA-C           omeprazole 20 MG tablet   Stopped by:  Laney Todd  ALEX           vitamin B complex with vitamin C Tabs tablet   Stopped by:  Laney Todd PA-C                Where to get your medicines      Some of these will need a paper prescription and others can be bought over the counter.  Ask your nurse if you have questions.     Bring a paper prescription for each of these medications     ranitidine 150 MG tablet                Primary Care Provider Office Phone # Fax #    Laney Todd PA-C [phone]       [address] [facility] [address]        Equal Access to Services     JENNIFER FORD : Hadii aad ku hadasho Soomaali, waaxda luqadaha, qaybta kaalmada adeegyada, waxay idiin hayaan adeeg kharatameka palacios . So [facility] [place] [phone].    ATENCIÓN: Si habla español, tiene a vera disposición servicios gratuitos de asistencia lingüística. [facility] [phone].    We comply with applicable federal civil rights laws and [place] laws. We do not discriminate on the basis of race, color, national origin, age, disability, sex, sexual orientation, or gender identity.            Thank you!     Thank you for choosing [facility]  for your care. Our goal is always to provide you with excellent care. Hearing back from our patients is one way we can continue to improve our services. Please take a few minutes to complete the written survey that you may receive in the mail after your visit with us. Thank you!             Your Updated Medication List - Protect others around you: Learn how to safely use, store and throw away your medicines at www.disposemymeds.org.          This list is accurate as of 10/12/18  9:22 AM.  Always use your most recent med list.                   Brand Name Dispense Instructions for use Diagnosis    clobetasol propionate 0.05 % Sham     1 Bottle    Apply topically daily as needed Apply sparingly to dry scalp once daily for 2 weeks, leave in place for 15 min, then add water, lather, rinse thoroughly.     Dermatitis       ketoconazole 2 % shampoo    NIZORAL    120 mL    Apply topically daily as needed for itching or irritation    Dermatitis, seborrheic       ranitidine 150 MG tablet    ZANTAC    60 tablet    Take 1 tablet (150 mg) by mouth 2 times daily    Gastroesophageal reflux disease without esophagitis       tretinoin 0.05 % cream    RETIN-A    45 g    Spread a pea size amount into affected area topically at bedtime.  Use sunscreen SPF>20.    Acne vulgaris

## 2018-10-12 NOTE — PROGRESS NOTES

## 2018-10-12 NOTE — PROGRESS NOTES
SUBJECTIVE:   CC: Shanta Edmondson is an 27 year old woman who presents for preventive health visit.     Physical   Annual:     Getting at least 3 servings of Calcium per day:  NO    Bi-annual eye exam:  NO    Dental care twice a year:  Yes    Sleep apnea or symptoms of sleep apnea:  Daytime drowsiness    Diet:  Regular (no restrictions)    Frequency of exercise:  2-3 days/week    Duration of exercise:  30-45 minutes    Taking medications regularly:  Not Applicable    Additional concerns today:  YES    Other:  -Stomach pain x few years - changed RLQ and epigastric pain over the past 2 weeks with cramp like sensation and achey/soreness; comes and goes sometimes with food then lasts for maybe 30 minutes; feels like it improves with stretching; regular BM every morning, maybe leans towards constipation at times. Water tends to make it worse; no specific foods though.  History of taking omeprazole in the past with some improvement; she has tried magnesium for restless leg in the past as well.    -Fatigue: wondering if related to above and/or has noticed her sleep schedule is off lately.    Today's PHQ-2 Score:   PHQ-2 ( 1999 Pfizer) 10/12/2018   Q1: Little interest or pleasure in doing things 2   Q2: Feeling down, depressed or hopeless 0   PHQ-2 Score 2   Q1: Little interest or pleasure in doing things More than half the days   Q2: Feeling down, depressed or hopeless Not at all   PHQ-2 Score 2       Abuse: Current or Past (Physical, Sexual or Emotional) - No  Do you feel safe in your environment - Yes    Social History   Substance Use Topics     Smoking status: Never Smoker     Smokeless tobacco: Never Used     Alcohol use No      Comment: once a year      Alcohol Use 10/12/2018   If you drink alcohol do you typically have greater than 3 drinks per day OR greater than 7 drinks per week? Not Applicable   No flowsheet data found.    Reviewed orders with patient.  Reviewed health maintenance and updated orders  accordingly - Yes  Labs reviewed in EPIC  BP Readings from Last 3 Encounters:   10/12/18 113/76   07/20/18 103/75   04/24/18 109/76    Wt Readings from Last 3 Encounters:   10/12/18 127 lb (57.6 kg)   09/22/17 117 lb (53.1 kg)   11/04/16 115 lb 8 oz (52.4 kg)                  Patient Active Problem List   Diagnosis     CARDIOVASCULAR SCREENING; LDL GOAL LESS THAN 160     Gastroesophageal reflux disease without esophagitis     Restless leg syndrome     Past Surgical History:   Procedure Laterality Date     NO HISTORY OF SURGERY         Social History   Substance Use Topics     Smoking status: Never Smoker     Smokeless tobacco: Never Used     Alcohol use No      Comment: once a year      Family History   Problem Relation Age of Onset     Diabetes Father      Lipids Father      Hypertension Maternal Grandmother      Diabetes Paternal Grandfather          Current Outpatient Prescriptions   Medication Sig Dispense Refill     clobetasol propionate 0.05 % SHAM Apply topically daily as needed Apply sparingly to dry scalp once daily for 2 weeks, leave in place for 15 min, then add water, lather, rinse thoroughly. 1 Bottle 1     ketoconazole (NIZORAL) 2 % shampoo Apply topically daily as needed for itching or irritation 120 mL 11     ranitidine (ZANTAC) 150 MG tablet Take 1 tablet (150 mg) by mouth 2 times daily 60 tablet 1     tretinoin (RETIN-A) 0.05 % cream Spread a pea size amount into affected area topically at bedtime.  Use sunscreen SPF>20. 45 g 11     No Known Allergies    Mammogram not appropriate for this patient based on age.    Pertinent mammograms are reviewed under the imaging tab.  History of abnormal Pap smear: NO - age 21-29 PAP every 3 years recommended     Reviewed and updated as needed this visit by clinical staff  Tobacco  Allergies  Meds  Problems  Med Hx  Surg Hx  Fam Hx  Soc Hx          Reviewed and updated as needed this visit by Provider  Allergies  Meds  Problems          Review of  "Systems   Gastrointestinal: Positive for abdominal pain, constipation and heartburn.   Neurological: Positive for weakness.      OBJECTIVE:   /76 (BP Location: Left arm, Patient Position: Sitting, Cuff Size: Adult Regular)  Pulse 83  Temp 98.7  F (37.1  C) (Oral)  Resp 18  Ht 5' 1.5\" (1.562 m)  Wt 127 lb (57.6 kg)  LMP 09/28/2018  SpO2 99%  BMI 23.61 kg/m2  Physical Exam  GENERAL: healthy, alert and no distress  EYES: Eyes grossly normal to inspection, PERRL and conjunctivae and sclerae normal  HENT: ear canals and TM's normal, nose and mouth without ulcers or lesions  NECK: no adenopathy, no asymmetry, masses, or scars and thyroid normal to palpation  RESP: lungs clear to auscultation - no rales, rhonchi or wheezes  CV: regular rate and rhythm, normal S1 S2, no S3 or S4, no murmur, click or rub, no peripheral edema and peripheral pulses strong  ABDOMEN: soft, nontender, no hepatosplenomegaly, no masses and bowel sounds normal  MS: no gross musculoskeletal defects noted, no edema  SKIN: no suspicious lesions or rashes  NEURO: Normal strength and tone, mentation intact and speech normal  PSYCH: mentation appears normal, affect normal/bright    Diagnostic Test Results:  none     ASSESSMENT/PLAN:       ICD-10-CM    1. Routine general medical examination at a health care facility Z00.00    2. Constipation, unspecified constipation type K59.00 TSH with free T4 reflex     XR Abdomen 2 Views     Testosterone, total   3. Abdominal pain, generalized R10.84 Comprehensive metabolic panel     CBC with platelets differential     XR Abdomen 2 Views   4. Gastroesophageal reflux disease without esophagitis K21.9 Comprehensive metabolic panel     CBC with platelets differential     ranitidine (ZANTAC) 150 MG tablet   5. CARDIOVASCULAR SCREENING; LDL GOAL LESS THAN 160 Z13.6 Lipid panel reflex to direct LDL Fasting   6. Need for prophylactic vaccination and inoculation against influenza Z23 FLU VACCINE, SPLIT VIRUS, IM " "(QUADRIVALENT) [29960]- >3 YRS     Vaccine Administration, Initial [48866]       COUNSELING:  Reviewed preventive health counseling, as reflected in patient instructions       Regular exercise       Healthy diet/nutrition       Vision screening    BP Readings from Last 1 Encounters:   10/12/18 113/76     Estimated body mass index is 23.61 kg/(m^2) as calculated from the following:    Height as of this encounter: 5' 1.5\" (1.562 m).    Weight as of this encounter: 127 lb (57.6 kg).     reports that she has never smoked. She has never used smokeless tobacco.      Counseling Resources:  ATP IV Guidelines  Pooled Cohorts Equation Calculator  Breast Cancer Risk Calculator  FRAX Risk Assessment  ICSI Preventive Guidelines  Dietary Guidelines for Americans, 2010  USDA's MyPlate  ASA Prophylaxis  Lung CA Screening    Laney Todd PA-C  [facility]    Answers for HPI/ROS submitted by the patient on 10/12/2018   PHQ-2 Score: 2    "

## 2018-10-12 NOTE — PATIENT INSTRUCTIONS
Trial of magnesium 200-400 mg nightly for the next few weeks.  If no improvement with above, prescription for Zantac 150 mg two times a day printed as next step.  Labs and stomach xray updated today.      Preventive Health Recommendations  Female Ages 21 to 25     Yearly exam:     See your health care provider every year in order to  o Review health changes.   o Discuss preventive care.    o Review your medicines if your doctor has prescribed any.      You should be tested each year for STDs (sexually transmitted diseases).       Talk to your provider about how often you should have cholesterol testing.      Get a Pap test every three years. If you have an abnormal result, your doctor may have you test more often.      If you are at risk for diabetes, you should have a diabetes test (fasting glucose).     Shots:     Get a flu shot each year.     Get a tetanus shot every 10 years.     Consider getting the shot (vaccine) that prevents cervical cancer (Gardasil).    Nutrition:     Eat at least 5 servings of fruits and vegetables each day.    Eat whole-grain bread, whole-wheat pasta and brown rice instead of white grains and rice.    Get adequate Calcium and Vitamin D.     Lifestyle    Exercise at least 150 minutes a week each week (30 minutes a day, 5 days a week). This will help you control your weight and prevent disease.    Limit alcohol to one drink per day.    No smoking.     Wear sunscreen to prevent skin cancer.    See your dentist every six months for an exam and cleaning.

## 2018-10-15 NOTE — PROGRESS NOTES
"Simeon Womack  Your attached xray is within normal limits but does comment on a decent amount of stool.  Please contact the office with any questions or concerns.    Laney Jaimes \"Ammon\" ALEX Todd  "

## 2018-10-16 LAB — TESTOST SERPL-MCNC: 42 NG/DL (ref 8–60)

## 2018-10-16 NOTE — PROGRESS NOTES
"Simeon Womack  Your attached labs are normal. Keep up the good work!  Please contact the office with any questions or concerns.    Laney Jaimes \"Ammon\" ALEX Todd  "

## 2019-09-07 ENCOUNTER — OFFICE VISIT (OUTPATIENT)
Dept: URGENT CARE | Facility: URGENT CARE | Age: 29
End: 2019-09-07
Payer: COMMERCIAL

## 2019-09-07 ENCOUNTER — APPOINTMENT (OUTPATIENT)
Dept: ULTRASOUND IMAGING | Facility: CLINIC | Age: 29
End: 2019-09-07
Attending: EMERGENCY MEDICINE
Payer: COMMERCIAL

## 2019-09-07 ENCOUNTER — HOSPITAL ENCOUNTER (EMERGENCY)
Facility: CLINIC | Age: 29
Discharge: HOME | End: 2019-09-07
Attending: EMERGENCY MEDICINE | Admitting: EMERGENCY MEDICINE
Payer: COMMERCIAL

## 2019-09-07 VITALS
SYSTOLIC BLOOD PRESSURE: 112 MMHG | DIASTOLIC BLOOD PRESSURE: 81 MMHG | TEMPERATURE: 98.7 F | OXYGEN SATURATION: 98 % | WEIGHT: 127 LBS | HEART RATE: 102 BPM | BODY MASS INDEX: 23.61 KG/M2

## 2019-09-07 VITALS
SYSTOLIC BLOOD PRESSURE: 112 MMHG | TEMPERATURE: 97.9 F | RESPIRATION RATE: 16 BRPM | HEIGHT: 61 IN | BODY MASS INDEX: 22.66 KG/M2 | OXYGEN SATURATION: 100 % | WEIGHT: 120 LBS | DIASTOLIC BLOOD PRESSURE: 58 MMHG

## 2019-09-07 DIAGNOSIS — B37.31 YEAST INFECTION OF THE VAGINA: ICD-10-CM

## 2019-09-07 DIAGNOSIS — R10.9 ABDOMINAL PAIN, UNSPECIFIED ABDOMINAL LOCATION: ICD-10-CM

## 2019-09-07 DIAGNOSIS — B37.31 CANDIDIASIS OF VAGINA: Primary | ICD-10-CM

## 2019-09-07 DIAGNOSIS — R10.31 RIGHT LOWER QUADRANT ABDOMINAL PAIN: ICD-10-CM

## 2019-09-07 LAB
ALBUMIN SERPL-MCNC: 3.7 G/DL (ref 3.4–5)
ALBUMIN UR-MCNC: NEGATIVE MG/DL
ALP SERPL-CCNC: 84 U/L (ref 40–150)
ALT SERPL W P-5'-P-CCNC: 11 U/L (ref 0–50)
ANION GAP SERPL CALCULATED.3IONS-SCNC: 5 MMOL/L (ref 3–14)
APPEARANCE UR: CLEAR
AST SERPL W P-5'-P-CCNC: 10 U/L (ref 0–45)
BACTERIA #/AREA URNS HPF: ABNORMAL /HPF
BASOPHILS # BLD AUTO: 0 10E9/L (ref 0–0.2)
BASOPHILS NFR BLD AUTO: 0.6 %
BILIRUB SERPL-MCNC: 0.6 MG/DL (ref 0.2–1.3)
BILIRUB UR QL STRIP: NEGATIVE
BUN SERPL-MCNC: 12 MG/DL (ref 7–30)
CALCIUM SERPL-MCNC: 8.9 MG/DL (ref 8.5–10.1)
CHLORIDE SERPL-SCNC: 107 MMOL/L (ref 94–109)
CO2 SERPL-SCNC: 28 MMOL/L (ref 20–32)
COLOR UR AUTO: YELLOW
CREAT SERPL-MCNC: 0.64 MG/DL (ref 0.52–1.04)
DIFFERENTIAL METHOD BLD: NORMAL
EOSINOPHIL # BLD AUTO: 0.1 10E9/L (ref 0–0.7)
EOSINOPHIL NFR BLD AUTO: 1.6 %
ERYTHROCYTE [DISTWIDTH] IN BLOOD BY AUTOMATED COUNT: 12.9 % (ref 10–15)
GFR SERPL CREATININE-BSD FRML MDRD: >90 ML/MIN/{1.73_M2}
GLUCOSE SERPL-MCNC: 85 MG/DL (ref 70–99)
GLUCOSE UR STRIP-MCNC: NEGATIVE MG/DL
HCG SERPL QL: NEGATIVE
HCT VFR BLD AUTO: 44.2 % (ref 35–47)
HGB BLD-MCNC: 14.8 G/DL (ref 11.7–15.7)
HGB UR QL STRIP: ABNORMAL
IMM GRANULOCYTES # BLD: 0 10E9/L (ref 0–0.4)
IMM GRANULOCYTES NFR BLD: 0 %
KETONES UR STRIP-MCNC: NEGATIVE MG/DL
LEUKOCYTE ESTERASE UR QL STRIP: NEGATIVE
LYMPHOCYTES # BLD AUTO: 1.9 10E9/L (ref 0.8–5.3)
LYMPHOCYTES NFR BLD AUTO: 37.4 %
MCH RBC QN AUTO: 30.8 PG (ref 26.5–33)
MCHC RBC AUTO-ENTMCNC: 33.5 G/DL (ref 31.5–36.5)
MCV RBC AUTO: 92 FL (ref 78–100)
MONOCYTES # BLD AUTO: 0.4 10E9/L (ref 0–1.3)
MONOCYTES NFR BLD AUTO: 8.2 %
NEUTROPHILS # BLD AUTO: 2.6 10E9/L (ref 1.6–8.3)
NEUTROPHILS NFR BLD AUTO: 52.2 %
NITRATE UR QL: NEGATIVE
NON-SQ EPI CELLS #/AREA URNS LPF: ABNORMAL /LPF
NRBC # BLD AUTO: 0 10*3/UL
NRBC BLD AUTO-RTO: 0 /100
PH UR STRIP: 6.5 PH (ref 5–7)
PLATELET # BLD AUTO: 300 10E9/L (ref 150–450)
POTASSIUM SERPL-SCNC: 3.6 MMOL/L (ref 3.4–5.3)
PROT SERPL-MCNC: 7.6 G/DL (ref 6.8–8.8)
RBC # BLD AUTO: 4.8 10E12/L (ref 3.8–5.2)
RBC #/AREA URNS AUTO: ABNORMAL /HPF
SODIUM SERPL-SCNC: 140 MMOL/L (ref 133–144)
SOURCE: ABNORMAL
SP GR UR STRIP: 1.02 (ref 1–1.03)
SPECIMEN SOURCE: ABNORMAL
UROBILINOGEN UR STRIP-ACNC: 0.2 EU/DL (ref 0.2–1)
WBC # BLD AUTO: 5 10E9/L (ref 4–11)
WBC #/AREA URNS AUTO: ABNORMAL /HPF
WET PREP SPEC: ABNORMAL

## 2019-09-07 PROCEDURE — 85025 COMPLETE CBC W/AUTO DIFF WBC: CPT | Performed by: EMERGENCY MEDICINE

## 2019-09-07 PROCEDURE — 80053 COMPREHEN METABOLIC PANEL: CPT | Performed by: EMERGENCY MEDICINE

## 2019-09-07 PROCEDURE — 99284 EMERGENCY DEPT VISIT MOD MDM: CPT | Mod: 25

## 2019-09-07 PROCEDURE — 99213 OFFICE O/P EST LOW 20 MIN: CPT | Performed by: INTERNAL MEDICINE

## 2019-09-07 PROCEDURE — 81001 URINALYSIS AUTO W/SCOPE: CPT | Performed by: INTERNAL MEDICINE

## 2019-09-07 PROCEDURE — 87210 SMEAR WET MOUNT SALINE/INK: CPT | Performed by: INTERNAL MEDICINE

## 2019-09-07 PROCEDURE — 93976 VASCULAR STUDY: CPT

## 2019-09-07 PROCEDURE — 84703 CHORIONIC GONADOTROPIN ASSAY: CPT | Performed by: EMERGENCY MEDICINE

## 2019-09-07 RX ORDER — FLUCONAZOLE 150 MG/1
150 TABLET ORAL ONCE
Qty: 1 TABLET | Refills: 0 | Status: SHIPPED | OUTPATIENT
Start: 2019-09-07 | End: 2019-10-21

## 2019-09-07 ASSESSMENT — ENCOUNTER SYMPTOMS
ARTHRALGIAS: 0
VOMITING: 0
NAUSEA: 1
DIARRHEA: 0
MYALGIAS: 0
CHILLS: 0
ABDOMINAL PAIN: 1
DYSURIA: 0
CONSTIPATION: 0
FEVER: 0

## 2019-09-07 ASSESSMENT — MIFFLIN-ST. JEOR: SCORE: 1221.7

## 2019-09-07 NOTE — PROGRESS NOTES
[facility] Progress Note        Mikhail Lau MD, MPH  09/07/2019        History:      A pleasant 28 year old year old female is seen accompanied by her mother for evaluation of the right lower quadrant abdominal pain that she has had for the past 5 days but worsened since last evening.  She refers to a history of constipation but did have a formed normal bowel movement this morning.  She has sensation of nausea but does not report any vomiting or diarrhea.  She states that she has felt unwell in the past 3 days but denies fever or chills.  She also wishes to be checked for UTI and vaginal infection although she denies any dysuria or any vaginal discharge at the present time.  She states that she is not sexually active.  No arthralgia or myalgias referred.  No rash.         Assessment and Plan:         UA reflex to Microscopic and Culture ([place] [facility] (except [place] and [place])    - Urine Microscopic      1. Right lower quadrant abdominal pain: Given the patient clinical presentation  And findings on exam the patient is directed to [facility] per her request for further evaluation and management plan.  Patient and her mother agree with this plan as stated.  She is advised to avoid drinking or eating until she is directed so by the emergency room medical staff.  Patient apart from the urgent care in a stable condition with stable vital signs.    -   2. Candidiasis of vagina  - Wet prep: Yeast were identified.  - fluconazole (DIFLUCAN) 150 MG tablet; Take 1 tablet (150 mg) by mouth once for 1 dose  Dispense: 1 tablet; Refill: 0  She is advised to follow-up with her primary care physician within 1 week for reevaluation.                   Physical Exam:      /81   Pulse 102   Temp 98.7  F (37.1  C) (Oral)   Wt 57.6 kg (127 lb)   LMP 09/06/2019   SpO2 98%   Breastfeeding? No   BMI 23.61 kg/m       Constitutional: Patient is in no distress The  patient is pleasant and cooperative.   HEENT: Head:  Head is atraumatic, normocephalic.    Eyes: Pupils are equal, round and reactive to light and accomodation.  Sclera is non-icteric. No conjunctival injection, or exudate noted. Extraocular motion is intact. Visual acuity is intact bilaterally.  Ears:  External acoustic canals are patent and clear.  There is no erythema and bulging( exudate)  of the ( R/L ) tympanic membrane(s ).   Nose:  No Nasal congestion w/o drainage or mucosal ulceration is noted.  Throat:  Oral mucosa is moist.  No oral lesions are noted.  No posterior pharyngeal hyperemia or exudate noted.     Neck Supple.  There is no cervical lymphadenopathy.  No nuchal rigidity noted.  There is no meningismus.     Cardiovascular: Heart is regular to rate and rhythm.  No murmur is noted.     Chest. Chest Symmetrical, no soft tissues, swelling, or tenderness upon palpation   Lungs: Clear in the anterior and posterior pulmonary fields.   Abdomen:  Right lower abdominal quadrant pain.  Bowel sounds are sluggish.  No abdominal distention is noted.   Back No flank tenderness is noted.   Extremeties No edema, no calf tenderness.   Neuro: No focal deficit.   Skin No petechiae or purpura is noted.  There is no rash.   Mood Normal              Medications:        PRN Meds:          Data:      All new lab and imaging data was reviewed.   Results for orders placed or performed in visit on 09/07/19   *UA reflex to Microscopic and Culture ([facility] (except [place] and [place])   Result Value Ref Range    Color Urine Yellow     Appearance Urine Clear     Glucose Urine Negative NEG^Negative mg/dL    Bilirubin Urine Negative NEG^Negative    Ketones Urine Negative NEG^Negative mg/dL    Specific Gravity Urine 1.020 1.003 - 1.035    Blood Urine Trace (A) NEG^Negative    pH Urine 6.5 5.0 - 7.0 pH    Protein Albumin Urine Negative NEG^Negative mg/dL    Urobilinogen Urine 0.2 0.2 - 1.0 EU/dL    Nitrite  Urine Negative NEG^Negative    Leukocyte Esterase Urine Negative NEG^Negative    Source Midstream Urine    Urine Microscopic   Result Value Ref Range    WBC Urine 0 - 5 OTO5^0 - 5 /HPF    RBC Urine O - 2 OTO2^O - 2 /HPF    Squamous Epithelial /LPF Urine Few FEW^Few /LPF    Bacteria Urine Few (A) NEG^Negative /HPF   Wet prep   Result Value Ref Range    Specimen Description Vagina     Wet Prep Yeast seen  Few   (A)     Wet Prep No WBC's seen     Wet Prep No clue cells seen     Wet Prep No Trichomonas seen

## 2019-09-07 NOTE — ED PROVIDER NOTES
"  History     Chief Complaint:  Abdominal Pain    HPI   Shanta Edmondson is a 28 year old female with history of GERD who presents with abdominal pain. The patient states she has been experiencing right lower quadrant abdominal pain intermittently for the past 5 days, which worsened yesterday evening after dinner, prompting evaluation at Urgent Care this morning. During today's evaluation here in the ED, she admits to nausea with slight abdominal pain, but denies constipation (had normal bowel movement this morning), emesis, diarrhea, fever, chills, dysuria, vaginal discharge, arthralgia, and myalgias. She notes constipation the past 2 weeks.  Urgent care obtained a UA, which showed only yeast but no other acute findings.    Allergies:  No known drug allergies    Medications:    The patient is not currently taking any prescribed medications.    Past Medical History:    Chicken pox  RLS  GERD    Past Surgical History:    History reviewed. No pertinent surgical history.    Family History:    Diabetes  Lipids    Social History:  Smoking status: Never  Alcohol use: No  Drug use: No  The patient presents to the emergency department with mother.  Marital Status:  Single [1]     Review of Systems   Constitutional: Negative for chills and fever.   Gastrointestinal: Positive for abdominal pain and nausea. Negative for constipation, diarrhea and vomiting.   Genitourinary: Negative for dysuria and vaginal discharge.   Musculoskeletal: Negative for arthralgias and myalgias.   All other systems reviewed and are negative.        Physical Exam     Patient Vitals for the past 24 hrs:   BP Temp Temp src Heart Rate Resp SpO2 Height Weight   09/07/19 1139 112/58 -- -- -- -- -- -- --   09/07/19 0956 112/75 97.9  F (36.6  C) Oral 91 16 100 % 1.549 m (5' 1\") 54.4 kg (120 lb)     Physical Exam  Nursing note and vitals reviewed.  Constitutional:  Oriented to person, place, and time. Cooperative.   HENT:   Nose:    Nose normal. "   Mouth/Throat:   Mucous membranes are normal.   Eyes:    Conjunctivae normal and EOM are normal.      Pupils are equal, round, and reactive to light.   Neck:    Trachea normal.   Cardiovascular:  Normal rate, regular rhythm, normal heart sounds and normal pulses. No murmur heard.  Pulmonary/Chest:  Effort normal and breath sounds normal.   Abdominal:   Soft. Normal appearance and bowel sounds are normal.      There is no tenderness to palpation at this time.      There is no rebound and no CVA tenderness.   Musculoskeletal:  Extremities atraumatic x 4.   Lymphadenopathy:  No cervical adenopathy.   Neurological:   Alert and oriented to person, place, and time. Normal strength.      No cranial nerve deficit or sensory deficit. GCS eye subscore is 4. GCS verbal subscore is 5. GCS motor subscore is 6.   Skin:    Skin is intact. No rash noted.   Psychiatric:   Normal mood and affect.      Emergency Department Course   Imaging:  Radiographic findings were communicated with the patient who voiced understanding of the findings.    US Pelvis Cmplt w Transvag & Doppler LmtPel Duplex Limited  IMPRESSION:  No acute abnormality is seen.    As read by Radiology.    Laboratory:  CMP: AWNL (Creatinine 0.64)  CBC: WNL (WBC 5.0, HGB 14.8, )    HCG neg.    Emergency Department Course:  Past medical records, nursing notes, and vitals reviewed.  1013: I performed an exam of the patient and obtained history, as documented above.    IV inserted and blood drawn.    The patient was sent for a pelvis US while in the emergency department, findings above.    1133: I rechecked the patient. Findings and plan explained to the Patient. Patient discharged home with instructions regarding supportive care, medications, and reasons to return. The importance of close follow-up was reviewed.     Impression & Plan    Medical Decision Making:  This is a 28-year-old female who was referred here for abdominal pain.  She actually did not have any  pain here though and had a very benign abdominal exam.  I felt it was reasonable nonetheless to check the above blood work as well as an ultrasound to rule out any pelvic pathology such as an ovarian cyst and pregnancy.  Her work-up here is unremarkable, and therefore at this point I feel that she does not require any further testing such as with a CT scan.  I recommended close outpatient follow-up though and certainly returning with any concerns or worsening symptoms and certainly with increasing pain, vomiting, fevers, or other concerns.  She was also already provided a prescription for fluconazole from the urgent care for the yeast infection.    Diagnosis:    ICD-10-CM   1. Abdominal pain, unspecified abdominal location R10.9   2. Yeast infection of the vagina B37.3     Disposition:  discharged to home with follow-up instructions.    Scribe Disclosure:  I, Sean Juarez, am serving as a scribe at 10:13 AM on 9/7/2019 to document services personally performed by Maurilio Lamar MD based on my observations and the provider's statements to me.    [facility] EMERGENCY DEPARTMENT       Maurilio Lamar MD  09/07/19 8839

## 2019-09-07 NOTE — ED AVS SNAPSHOT
Emergency Department  [address] [place] [id number]  Phone:  [phone]  Fax:  [phone]                                    Shanta Edmondson   MRN: [id number]    Department:   Emergency Department   Date of Visit:  9/7/2019           After Visit Summary Signature Page    I have received my discharge instructions, and my questions have been answered. I have discussed any challenges I see with this plan with the nurse or doctor.    ..........................................................................................................................................  Patient/Patient Representative Signature      ..........................................................................................................................................  Patient Representative Print Name and Relationship to Patient    ..................................................               ................................................  Date                                   Time    ..........................................................................................................................................  Reviewed by Signature/Title    ...................................................              ..............................................  Date                                               Time          22EPIC Rev 08/18

## 2019-10-02 ENCOUNTER — OFFICE VISIT (OUTPATIENT)
Dept: FAMILY MEDICINE | Facility: CLINIC | Age: 29
End: 2019-10-02
Payer: COMMERCIAL

## 2019-10-02 VITALS
WEIGHT: 120.25 LBS | SYSTOLIC BLOOD PRESSURE: 100 MMHG | HEART RATE: 98 BPM | DIASTOLIC BLOOD PRESSURE: 76 MMHG | BODY MASS INDEX: 22.7 KG/M2 | TEMPERATURE: 98.4 F | HEIGHT: 61 IN | OXYGEN SATURATION: 99 %

## 2019-10-02 DIAGNOSIS — R82.90 NONSPECIFIC FINDING ON EXAMINATION OF URINE: ICD-10-CM

## 2019-10-02 DIAGNOSIS — N30.01 ACUTE CYSTITIS WITH HEMATURIA: Primary | ICD-10-CM

## 2019-10-02 DIAGNOSIS — R31.0 GROSS HEMATURIA: ICD-10-CM

## 2019-10-02 DIAGNOSIS — Z23 NEED FOR PROPHYLACTIC VACCINATION AND INOCULATION AGAINST INFLUENZA: ICD-10-CM

## 2019-10-02 LAB
ALBUMIN UR-MCNC: >=300 MG/DL
APPEARANCE UR: ABNORMAL
BACTERIA #/AREA URNS HPF: ABNORMAL /HPF
BILIRUB UR QL STRIP: ABNORMAL
COLOR UR AUTO: ABNORMAL
GLUCOSE UR STRIP-MCNC: NEGATIVE MG/DL
HGB UR QL STRIP: ABNORMAL
KETONES UR STRIP-MCNC: 15 MG/DL
LEUKOCYTE ESTERASE UR QL STRIP: ABNORMAL
NITRATE UR QL: POSITIVE
NON-SQ EPI CELLS #/AREA URNS LPF: ABNORMAL /LPF
PH UR STRIP: 6 PH (ref 5–7)
RBC #/AREA URNS AUTO: >100 /HPF
SOURCE: ABNORMAL
SP GR UR STRIP: 1.02 (ref 1–1.03)
UROBILINOGEN UR STRIP-ACNC: 1 EU/DL (ref 0.2–1)
WBC #/AREA URNS AUTO: ABNORMAL /HPF

## 2019-10-02 PROCEDURE — 81001 URINALYSIS AUTO W/SCOPE: CPT | Performed by: FAMILY MEDICINE

## 2019-10-02 PROCEDURE — 87186 SC STD MICRODIL/AGAR DIL: CPT | Performed by: FAMILY MEDICINE

## 2019-10-02 PROCEDURE — 90471 IMMUNIZATION ADMIN: CPT | Performed by: FAMILY MEDICINE

## 2019-10-02 PROCEDURE — 99213 OFFICE O/P EST LOW 20 MIN: CPT | Mod: 25 | Performed by: FAMILY MEDICINE

## 2019-10-02 PROCEDURE — 90686 IIV4 VACC NO PRSV 0.5 ML IM: CPT | Performed by: FAMILY MEDICINE

## 2019-10-02 PROCEDURE — 87086 URINE CULTURE/COLONY COUNT: CPT | Performed by: FAMILY MEDICINE

## 2019-10-02 PROCEDURE — 87088 URINE BACTERIA CULTURE: CPT | Performed by: FAMILY MEDICINE

## 2019-10-02 RX ORDER — SULFAMETHOXAZOLE/TRIMETHOPRIM 800-160 MG
1 TABLET ORAL 2 TIMES DAILY
Qty: 6 TABLET | Refills: 0 | Status: SHIPPED | OUTPATIENT
Start: 2019-10-02 | End: 2019-10-21

## 2019-10-02 ASSESSMENT — MIFFLIN-ST. JEOR: SCORE: 1222.83

## 2019-10-02 NOTE — PATIENT INSTRUCTIONS
"Because you had blood in your urine, I recommend that you return in a few weeks for repeat urine test to make sure that the blood is no longer present when you are feeling better.     Patient Education     Bladder Infection, Female (Adult)    Urine is normally doesn't have any bacteria in it. But bacteria can get into the urinary tract from the skin around the rectum. Or they can travel in the blood from elsewhere in the body. Once they are in your urinary tract, they can cause infection in the urethra (urethritis), the bladder (cystitis), or the kidneys (pyelonephritis).  The most common place for an infection is in the bladder. This is called a bladder infection. This is one of the most common infections in women. Most bladder infections are easily treated. They are not serious unless the infection spreads to the kidney.  The phrases \"bladder infection,\" \"UTI,\" and \"cystitis\" are often used to describe the same thing. But they are not always the same. Cystitis is an inflammation of the bladder. The most common cause of cystitis is an infection.  Symptoms  The infection causes inflammation in the urethra and bladder. This causes many of the symptoms. The most common symptoms of a bladder infection are:    Pain or burning when urinating    Having to urinate more often than usual    Urgent need to urinate    Only a small amount of urine comes out    Blood in urine    Abdominal discomfort. This is usually in the lower abdomen above the pubic bone.    Cloudy urine    Strong- or bad-smelling urine    Unable to urinate (urinary retention)    Unable to hold urine in (urinary incontinence)    Fever    Loss of appetite    Confusion (in older adults)  Causes  Bladder infections are not contagious. You can't get one from someone else, from a toilet seat, or from sharing a bath.  The most common cause of bladder infections is bacteria from the bowels. The bacteria get onto the skin around the opening of the urethra. From " there, they can get into the urine and travel up to the bladder, causing inflammation and infection. This usually happens because of:    Wiping improperly after urinating. Always wipe from front to back.    Bowel incontinence    Pregnancy    Procedures such as having a catheter inserted    Older age    Not emptying your bladder. This can allow bacteria a chance to grow in your urine.    Dehydration    Constipation    Sex    Use of a diaphragm for birth control   Treatment  Bladder infections are diagnosed by a urine test. They are treated with antibiotics and usually clear up quickly without complications. Treatment helps prevent a more serious kidney infection.  Medicines  Medicines can help in the treatment of a bladder infection:    Take antibiotics until they are used up, even if you feel better. It is important to finish them to make sure the infection has cleared.    You can use acetaminophen or ibuprofen for pain, fever, or discomfort, unless another medicine was prescribed. If you have chronic liver or kidney disease, talk with your healthcare provider before using these medicines. Also talk with your provider if you've ever had a stomach ulcer or gastrointestinal bleeding, or are taking blood-thinner medicines.    If you are given phenazopydridine to reduce burning with urination, it will cause your urine to become a bright orange color. This can stain clothing.  Care and prevention  These self-care steps can help prevent future infections:    Drink plenty of fluids to prevent dehydration and flush out your bladder. Do this unless you must restrict fluids for other health reasons, or your doctor told you not to.    Proper cleaning after going to the bathroom is important. Wipe from front to back after using the toilet to prevent the spread of bacteria.    Urinate more often. Don't try to hold urine in for a long time.    Wear loose-fitting clothes and cotton underwear. Avoid tight-fitting pants.    Improve  your diet and prevent constipation. Eat more fresh fruit and vegetables, and fiber, and less junk and fatty foods.    Avoid sex until your symptoms are gone.    Avoid caffeine, alcohol, and spicy foods. These can irritate your bladder.    Urinate right after intercourse to flush out your bladder.    If you use birth control pills and have frequent bladder infections, discuss it with your doctor.  Follow-up care  Call your healthcare provider if all symptoms are not gone after 3 days of treatment. This is especially important if you have repeat infections.  If a culture was done, you will be told if your treatment needs to be changed. If directed, you can call to find out the results.  If X-rays were done, you will be told if the results will affect your treatment.  Call 911  Call 911 if any of the following occur:    Trouble breathing    Hard to wake up or confusion    Fainting or loss of consciousness    Rapid heart rate  When to seek medical advice  Call your healthcare provider right away if any of these occur:    Fever of 100.4 F (38.0 C) or higher, or as directed by your healthcare provider    Symptoms are not better by the third day of treatment    Back or belly (abdominal) pain that gets worse    Repeated vomiting, or unable to keep medicine down    Weakness or dizziness    Vaginal discharge    Pain, redness, or swelling in the outer vaginal area (labia)  Date Last Reviewed: 10/1/2016    [id number] [employer]. [address] [place] [address], [place] [address]. All rights reserved. This information is not intended as a substitute for professional medical care. Always follow your healthcare professional's instructions.

## 2019-10-02 NOTE — PROGRESS NOTES
SUBJECTIVE:   Shanta Edmondson is a 28 year old female who presents to clinic today for the following health issues:      URINARY TRACT SYMPTOMS  Onset: yesterday     Description:   Painful urination (Dysuria): YES- towards the end            Frequency: no   Blood in urine (Hematuria): YES  Delay in urine (Hesitency): YES    Intensity: mild    Progression of Symptoms:  improving and constant, it was getting better throughout the day and then she stopped drinkign water and this morning she notices some blood in her urine     Accompanying Signs & Symptoms:  Fever/chills: no   Flank pain no   Nausea and vomiting: no   Any vaginal symptoms: none  Abdominal/Pelvic Pain: no     History:   History of frequent UTI's: no   History of kidney stones: no   Sexually Active: no   Possibility of pregnancy: No    Precipitating factors:   Nothing     Therapies Tried and outcome: water     Yesterday she noticed some blood in her urine and some burning sensation at the end of urination.  The dysuria continued today along with frequency.  She denies any abdominal pain or back pain.  She denies any fevers.  She is otherwise doing well.  Denies any history of bladder infections.  Denies any history of sexual intercourse.  Denies any history of kidney stone.    She was seen on 9/7/2019 with abdominal pain and nausea.  This feels different. Evaluation included normal UA, ultrasound, CBC, CMP.  She had some yeast on her wet prep and was treated for vaginal yeast infection with fluconazole.    Problem list and histories reviewed & adjusted, as indicated.  Additional history: as documented    Patient Active Problem List   Diagnosis     CARDIOVASCULAR SCREENING; LDL GOAL LESS THAN 160     Gastroesophageal reflux disease without esophagitis     Restless leg syndrome     Past Surgical History:   Procedure Laterality Date     NO HISTORY OF SURGERY         Social History     Tobacco Use     Smoking status: Never Smoker     Smokeless  tobacco: Never Used   Substance Use Topics     Alcohol use: No     Alcohol/week: 0.0 standard drinks     Comment: once a year      Family History   Problem Relation Age of Onset     Diabetes Father      Lipids Father      Hypertension Maternal Grandmother      Diabetes Paternal Grandfather          Current Outpatient Medications   Medication Sig Dispense Refill     sulfamethoxazole-trimethoprim (BACTRIM DS/SEPTRA DS) 800-160 MG tablet Take 1 tablet by mouth 2 times daily for 3 days 6 tablet 0     tretinoin (RETIN-A) 0.05 % cream Spread a pea size amount into affected area topically at bedtime.  Use sunscreen SPF>20. 45 g 11     clobetasol propionate 0.05 % SHAM Apply topically daily as needed Apply sparingly to dry scalp once daily for 2 weeks, leave in place for 15 min, then add water, lather, rinse thoroughly. (Patient not taking: Reported on 9/7/2019) 1 Bottle 1     ketoconazole (NIZORAL) 2 % shampoo Apply topically daily as needed for itching or irritation (Patient not taking: Reported on 9/7/2019) 120 mL 11     ranitidine (ZANTAC) 150 MG tablet Take 1 tablet (150 mg) by mouth 2 times daily (Patient not taking: Reported on 9/7/2019) 60 tablet 1     No Known Allergies  Recent Labs   Lab Test 09/07/19  1015 10/12/18  1002 11/04/16  0825 09/29/15  0829   LDL  --  64 68 74   HDL  --  47* 40* 52   TRIG  --  56 63 58   ALT 11 14  --  10   CR 0.64 0.55  --  0.69   GFRESTIMATED >90 >90  --  >90  Non  GFR Calc     GFRESTBLACK >90 >90  --  >90  African American GFR Calc     POTASSIUM 3.6 3.8  --  4.3   TSH  --  0.65  --   --       BP Readings from Last 3 Encounters:   10/02/19 100/76   09/07/19 112/58   09/07/19 112/81    Wt Readings from Last 3 Encounters:   10/02/19 54.5 kg (120 lb 4 oz)   09/07/19 54.4 kg (120 lb)   09/07/19 57.6 kg (127 lb)              Reviewed and updated as needed this visit by clinical staff  Tobacco  Allergies  Meds       Reviewed and updated as needed this visit by  "Provider         ROS:  As above     OBJECTIVE:     /76 (BP Location: Left arm, Patient Position: Sitting, Cuff Size: Adult Regular)   Pulse 98   Temp 98.4  F (36.9  C) (Oral)   Ht 1.549 m (5' 1\")   Wt 54.5 kg (120 lb 4 oz)   LMP 09/26/2019 (Exact Date)   SpO2 99%   BMI 22.72 kg/m    Body mass index is 22.72 kg/m .  /76 (BP Location: Left arm, Patient Position: Sitting, Cuff Size: Adult Regular)   Pulse 98   Temp 98.4  F (36.9  C) (Oral)   Ht 1.549 m (5' 1\")   Wt 54.5 kg (120 lb 4 oz)   LMP 09/26/2019 (Exact Date)   SpO2 99%   BMI 22.72 kg/m      Gen: alert, no acute distress  Eyes: anicteric, normal lids and conjunctiva; PERRL  NECK: no masses, no thyromegaly appreciated  Resp: CTAB, normal respiratory effort  CV: Regular rate and rhythm, no MGR, no peripheral edema  ABD; soft, nontender, no appreciable masses or hepatosplenomegaly.   Back: no CVA tenderness  Psych: A and O x 3, appropriate affect     Diagnostic Test Results:  Results for orders placed or performed in visit on 10/02/19 (from the past 24 hour(s))   *UA reflex to Microscopic and Culture ([facility] [facility] (except [place] and [place])   Result Value Ref Range    Color Urine Brown     Appearance Urine Cloudy     Glucose Urine Negative NEG^Negative mg/dL    Bilirubin Urine Small (A) NEG^Negative    Ketones Urine 15 (A) NEG^Negative mg/dL    Specific Gravity Urine 1.025 1.003 - 1.035    Blood Urine Large (A) NEG^Negative    pH Urine 6.0 5.0 - 7.0 pH    Protein Albumin Urine >=300 (A) NEG^Negative mg/dL    Urobilinogen Urine 1.0 0.2 - 1.0 EU/dL    Nitrite Urine Positive (A) NEG^Negative    Leukocyte Esterase Urine Large (A) NEG^Negative    Source Midstream Urine    Urine Microscopic   Result Value Ref Range    WBC Urine 10-25 (A) OTO5^0 - 5 /HPF    RBC Urine >100 (A) OTO2^O - 2 /HPF    Squamous Epithelial /LPF Urine Few FEW^Few /LPF    Bacteria Urine Moderate (A) NEG^Negative /HPF       ASSESSMENT/PLAN:            " "ICD-10-CM    1. Acute cystitis with hematuria N30.01 sulfamethoxazole-trimethoprim (BACTRIM DS/SEPTRA DS) 800-160 MG tablet   2. Gross hematuria R31.0 *UA reflex to Microscopic and Culture ([place] [facility] (except [place] and [place])     Urine Microscopic   3. Need for prophylactic vaccination and inoculation against influenza Z23 INFLUENZA VACCINE IM > 6 MONTHS VALENT IIV4 [14777]     Vaccine Administration, Initial [63118]   4. Nonspecific finding on examination of urine R82.90 Urine Culture Aerobic Bacterial     Treated empirically with Bactrim DS as above. Advised return to clinic for repeat urine when feeling better to document resolution of hematuria. She will let us know if symptoms persist or worsen.     She is due for her preventive visit and is overdue for her Pap test.  She will schedule with her PCP.    Patient Instructions   Because you had blood in your urine, I recommend that you return in a few weeks for repeat urine test to make sure that the blood is no longer present when you are feeling better.     Patient Education     Bladder Infection, Female (Adult)    Urine is normally doesn't have any bacteria in it. But bacteria can get into the urinary tract from the skin around the rectum. Or they can travel in the blood from elsewhere in the body. Once they are in your urinary tract, they can cause infection in the urethra (urethritis), the bladder (cystitis), or the kidneys (pyelonephritis).  The most common place for an infection is in the bladder. This is called a bladder infection. This is one of the most common infections in women. Most bladder infections are easily treated. They are not serious unless the infection spreads to the kidney.  The phrases \"bladder infection,\" \"UTI,\" and \"cystitis\" are often used to describe the same thing. But they are not always the same. Cystitis is an inflammation of the bladder. The most common cause of cystitis is an infection.  Symptoms  The " infection causes inflammation in the urethra and bladder. This causes many of the symptoms. The most common symptoms of a bladder infection are:    Pain or burning when urinating    Having to urinate more often than usual    Urgent need to urinate    Only a small amount of urine comes out    Blood in urine    Abdominal discomfort. This is usually in the lower abdomen above the pubic bone.    Cloudy urine    Strong- or bad-smelling urine    Unable to urinate (urinary retention)    Unable to hold urine in (urinary incontinence)    Fever    Loss of appetite    Confusion (in older adults)  Causes  Bladder infections are not contagious. You can't get one from someone else, from a toilet seat, or from sharing a bath.  The most common cause of bladder infections is bacteria from the bowels. The bacteria get onto the skin around the opening of the urethra. From there, they can get into the urine and travel up to the bladder, causing inflammation and infection. This usually happens because of:    Wiping improperly after urinating. Always wipe from front to back.    Bowel incontinence    Pregnancy    Procedures such as having a catheter inserted    Older age    Not emptying your bladder. This can allow bacteria a chance to grow in your urine.    Dehydration    Constipation    Sex    Use of a diaphragm for birth control   Treatment  Bladder infections are diagnosed by a urine test. They are treated with antibiotics and usually clear up quickly without complications. Treatment helps prevent a more serious kidney infection.  Medicines  Medicines can help in the treatment of a bladder infection:    Take antibiotics until they are used up, even if you feel better. It is important to finish them to make sure the infection has cleared.    You can use acetaminophen or ibuprofen for pain, fever, or discomfort, unless another medicine was prescribed. If you have chronic liver or kidney disease, talk with your healthcare provider before  using these medicines. Also talk with your provider if you've ever had a stomach ulcer or gastrointestinal bleeding, or are taking blood-thinner medicines.    If you are given phenazopydridine to reduce burning with urination, it will cause your urine to become a bright orange color. This can stain clothing.  Care and prevention  These self-care steps can help prevent future infections:    Drink plenty of fluids to prevent dehydration and flush out your bladder. Do this unless you must restrict fluids for other health reasons, or your doctor told you not to.    Proper cleaning after going to the bathroom is important. Wipe from front to back after using the toilet to prevent the spread of bacteria.    Urinate more often. Don't try to hold urine in for a long time.    Wear loose-fitting clothes and cotton underwear. Avoid tight-fitting pants.    Improve your diet and prevent constipation. Eat more fresh fruit and vegetables, and fiber, and less junk and fatty foods.    Avoid sex until your symptoms are gone.    Avoid caffeine, alcohol, and spicy foods. These can irritate your bladder.    Urinate right after intercourse to flush out your bladder.    If you use birth control pills and have frequent bladder infections, discuss it with your doctor.  Follow-up care  Call your healthcare provider if all symptoms are not gone after 3 days of treatment. This is especially important if you have repeat infections.  If a culture was done, you will be told if your treatment needs to be changed. If directed, you can call to find out the results.  If X-rays were done, you will be told if the results will affect your treatment.  Call 911  Call 911 if any of the following occur:    Trouble breathing    Hard to wake up or confusion    Fainting or loss of consciousness    Rapid heart rate  When to seek medical advice  Call your healthcare provider right away if any of these occur:    Fever of 100.4 F (38.0 C) or higher, or as directed  by your healthcare provider    Symptoms are not better by the third day of treatment    Back or belly (abdominal) pain that gets worse    Repeated vomiting, or unable to keep medicine down    Weakness or dizziness    Vaginal discharge    Pain, redness, or swelling in the outer vaginal area (labia)  Date Last Reviewed: 10/1/2016    0071-1326 [employer]. [address] [place] [address]. All rights reserved. This information is not intended as a substitute for professional medical care. Always follow your healthcare professional's instructions.               Maura Mccabe M.D.        [facility]

## 2019-10-04 LAB
BACTERIA SPEC CULT: ABNORMAL
SPECIMEN SOURCE: ABNORMAL

## 2019-10-21 ENCOUNTER — OFFICE VISIT (OUTPATIENT)
Dept: FAMILY MEDICINE | Facility: CLINIC | Age: 29
End: 2019-10-21
Payer: COMMERCIAL

## 2019-10-21 VITALS
BODY MASS INDEX: 22.08 KG/M2 | RESPIRATION RATE: 21 BRPM | TEMPERATURE: 98.8 F | HEART RATE: 86 BPM | OXYGEN SATURATION: 97 % | WEIGHT: 120 LBS | HEIGHT: 62 IN | DIASTOLIC BLOOD PRESSURE: 60 MMHG | SYSTOLIC BLOOD PRESSURE: 98 MMHG

## 2019-10-21 DIAGNOSIS — Z00.00 ROUTINE GENERAL MEDICAL EXAMINATION AT A HEALTH CARE FACILITY: Primary | ICD-10-CM

## 2019-10-21 PROCEDURE — 99395 PREV VISIT EST AGE 18-39: CPT | Performed by: PHYSICIAN ASSISTANT

## 2019-10-21 ASSESSMENT — ENCOUNTER SYMPTOMS
FEVER: 0
DIARRHEA: 0
HEMATURIA: 0
CHILLS: 0
DIZZINESS: 0
HEMATOCHEZIA: 0
FREQUENCY: 0
NERVOUS/ANXIOUS: 0
COUGH: 0
ABDOMINAL PAIN: 0
CONSTIPATION: 1

## 2019-10-21 ASSESSMENT — MIFFLIN-ST. JEOR: SCORE: 1229.63

## 2019-10-21 NOTE — PROGRESS NOTES
SUBJECTIVE:   CC: Shanta Edmondson is an 28 year old woman who presents for preventive health visit.     Healthy Habits:     Getting at least 3 servings of Calcium per day:  Yes    Bi-annual eye exam:  NO    Dental care twice a year:  Yes    Sleep apnea or symptoms of sleep apnea:  None    Diet:  Regular (no restrictions)    Frequency of exercise:  2-3 days/week    Duration of exercise:  45-60 minutes    Taking medications regularly:  Not Applicable    Medication side effects:  None    PHQ-2 Total Score: 1    Additional concerns today:  No      Today's PHQ-2 Score:   PHQ-2 ( 1999 [employer]) 10/21/2019   Q1: Little interest or pleasure in doing things 1   Q2: Feeling down, depressed or hopeless 0   PHQ-2 Score 1   Q1: Little interest or pleasure in doing things Several days   Q2: Feeling down, depressed or hopeless Not at all   PHQ-2 Score 1       Abuse: Current or Past (Physical, Sexual or Emotional) - No  Do you feel safe in your environment? Yes    Social History     Tobacco Use     Smoking status: Never Smoker     Smokeless tobacco: Never Used   Substance Use Topics     Alcohol use: No     Alcohol/week: 0.0 standard drinks     Comment: once a year      If you drink alcohol do you typically have >3 drinks per day or >7 drinks per week? No    Alcohol Use 10/21/2019   Prescreen: >3 drinks/day or >7 drinks/week? Not Applicable   Prescreen: >3 drinks/day or >7 drinks/week? -   No flowsheet data found.    Reviewed orders with patient.  Reviewed health maintenance and updated orders accordingly - Yes  Labs reviewed in EPIC  BP Readings from Last 3 Encounters:   10/21/19 98/60   10/02/19 100/76   09/07/19 112/58    Wt Readings from Last 3 Encounters:   10/21/19 54.4 kg (120 lb)   10/02/19 54.5 kg (120 lb 4 oz)   09/07/19 54.4 kg (120 lb)                  Patient Active Problem List   Diagnosis     CARDIOVASCULAR SCREENING; LDL GOAL LESS THAN 160     Gastroesophageal reflux disease without esophagitis     Restless  leg syndrome     Past Surgical History:   Procedure Laterality Date     NO HISTORY OF SURGERY         Social History     Tobacco Use     Smoking status: Never Smoker     Smokeless tobacco: Never Used   Substance Use Topics     Alcohol use: No     Alcohol/week: 0.0 standard drinks     Comment: once a year      Family History   Problem Relation Age of Onset     Diabetes Father      Lipids Father      Hypertension Maternal Grandmother      Diabetes Paternal Grandfather          No current outpatient medications on file.     No Known Allergies  Recent Labs   Lab Test 09/07/19  1015 10/12/18  1002 11/04/16  0825 09/29/15  0829   LDL  --  64 68 74   HDL  --  47* 40* 52   TRIG  --  56 63 58   ALT 11 14  --  10   CR 0.64 0.55  --  0.69   GFRESTIMATED >90 >90  --  >90  Non  GFR Calc     GFRESTBLACK >90 >90  --  >90  African American GFR Calc     POTASSIUM 3.6 3.8  --  4.3   TSH  --  0.65  --   --         Mammogram not appropriate for this patient based on age.    Pertinent mammograms are reviewed under the imaging tab.  History of abnormal Pap smear: patient has never been sexually active     Reviewed and updated as needed this visit by clinical staff  Tobacco  Allergies  Meds  Problems  Med Hx  Surg Hx  Fam Hx  Soc Hx          Reviewed and updated as needed this visit by Provider  Tobacco  Allergies  Meds  Problems  Med Hx  Surg Hx  Fam Hx            Review of Systems   Constitutional: Negative for chills and fever.   HENT: Negative for congestion and ear pain.    Respiratory: Negative for cough.    Cardiovascular: Negative for chest pain.   Gastrointestinal: Positive for constipation. Negative for abdominal pain, diarrhea and hematochezia.   Genitourinary: Negative for frequency and hematuria.   Neurological: Negative for dizziness.   Psychiatric/Behavioral: The patient is not nervous/anxious.         OBJECTIVE:   BP 98/60 (BP Location: Left arm, Patient Position: Sitting, Cuff Size: Adult  "Regular)   Pulse 86   Temp 98.8  F (37.1  C) (Oral)   Resp 21   Ht 1.562 m (5' 1.5\")   Wt 54.4 kg (120 lb)   LMP 10/15/2019   SpO2 97%   BMI 22.31 kg/m    Physical Exam  GENERAL: healthy, alert and no distress  EYES: Eyes grossly normal to inspection, PERRL and conjunctivae and sclerae normal  HENT: ear canals and TM's normal, nose and mouth without ulcers or lesions  NECK: no adenopathy, no asymmetry, masses, or scars and thyroid normal to palpation  RESP: lungs clear to auscultation - no rales, rhonchi or wheezes  CV: regular rate and rhythm, normal S1 S2, no S3 or S4, no murmur, click or rub, no peripheral edema and peripheral pulses strong  ABDOMEN: soft, nontender, no hepatosplenomegaly, no masses and bowel sounds normal  MS: no gross musculoskeletal defects noted, no edema  SKIN: no suspicious lesions or rashes  NEURO: Normal strength and tone, mentation intact and speech normal  PSYCH: mentation appears normal, affect normal/bright    Diagnostic Test Results:  Labs reviewed in Epic    ASSESSMENT/PLAN:       ICD-10-CM    1. Routine general medical examination at a health care facility Z00.00        COUNSELING:  Reviewed preventive health counseling, as reflected in patient instructions       Regular exercise       Healthy diet/nutrition       Vision screening    Estimated body mass index is 22.31 kg/m  as calculated from the following:    Height as of this encounter: 1.562 m (5' 1.5\").    Weight as of this encounter: 54.4 kg (120 lb).       reports that she has never smoked. She has never used smokeless tobacco.      Counseling Resources:  ATP IV Guidelines  Pooled Cohorts Equation Calculator  Breast Cancer Risk Calculator  FRAX Risk Assessment  ICSI Preventive Guidelines  Dietary Guidelines for Americans, 2010  USDA's MyPlate  ASA Prophylaxis  Lung CA Screening    Laney Todd PA-C  [facility]  "

## 2019-12-23 NOTE — PROGRESS NOTES
Subjective     Shanta Edmondson is a 29 year old female who presents to clinic today for the following health issues:    HPI   Breast Problem      Duration: x 10 days    Description (location/character/radiation):  Sudden sharp breast pain while doing lat pull-downs at gym, then right breast got swollen, is still swollen and tender    Intensity:  mild    Accompanying signs and symptoms: swelling    History (similar episodes/previous evaluation): None    Precipitating or alleviating factors: movement causes sharp pulsing of pain    Therapies tried and outcome: aleve which helped somewhat     Was doing strength machine (lat pull-downs) at gym on 12/21/2019  Sudden sharp pain in right breast when she pulled down  Developed a lump in the right breast which continued to swell over the next few days.  Soreness over outer right breast, exacerbated by seat belt, bumpy car ride  Feels lumpy and tender  Has improved slightly over past 10 days.    BP Readings from Last 3 Encounters:   12/30/19 102/62   10/21/19 98/60   10/02/19 100/76    Wt Readings from Last 3 Encounters:   12/30/19 55.8 kg (123 lb)   10/21/19 54.4 kg (120 lb)   10/02/19 54.5 kg (120 lb 4 oz)               Reviewed and updated as needed this visit by Provider  Meds  Problems         Review of Systems         Objective    /62 (BP Location: Left arm, Patient Position: Sitting, Cuff Size: Adult Regular)   Pulse 77   Temp 98.7  F (37.1  C) (Oral)   Resp 14   Wt 55.8 kg (123 lb)   LMP 12/01/2019 (Exact Date)   SpO2 100%   Breastfeeding No   BMI 22.86 kg/m    Body mass index is 22.86 kg/m .  Physical Exam   GEN:  no apparent distress  BREASTS: normal to inspection without asymmetry, erythema, or ecchymosis, no nipple discharge, and right breast with large tender mass at 9:00, and no palpable axillary masses or adenopathy     Diagnostic Test Results:  Labs reviewed in Epic        Assessment & Plan       ICD-10-CM    1. Breast pain N64.4 US  Breast Right Limited 1-3 Quadrants     I suspect this represents a soft-tissue injury to the breast - likely a contusion or hematoma.  We discussed options of monitoring clinically vs pursuing imaging.  She is concerned and would like to pursue imaging.  Given that the breast is acutely tender I ordered ultrasound and recommended she wait a week to have that done to minimize the discomfort of the exam.      Patient Instructions   Call [facility] appointment line [phone] to schedule breast ultrasound.        No follow-ups on file.    Maura Sun MD  [facility]

## 2019-12-30 ENCOUNTER — OFFICE VISIT (OUTPATIENT)
Dept: FAMILY MEDICINE | Facility: CLINIC | Age: 29
End: 2019-12-30
Payer: COMMERCIAL

## 2019-12-30 VITALS
DIASTOLIC BLOOD PRESSURE: 62 MMHG | RESPIRATION RATE: 14 BRPM | BODY MASS INDEX: 22.86 KG/M2 | SYSTOLIC BLOOD PRESSURE: 102 MMHG | HEART RATE: 77 BPM | OXYGEN SATURATION: 100 % | TEMPERATURE: 98.7 F | WEIGHT: 123 LBS

## 2019-12-30 DIAGNOSIS — N64.4 BREAST PAIN: Primary | ICD-10-CM

## 2019-12-30 PROCEDURE — 99213 OFFICE O/P EST LOW 20 MIN: CPT | Performed by: FAMILY MEDICINE

## 2019-12-30 NOTE — PATIENT INSTRUCTIONS
Call [facility] appointment line [phone] to schedule breast ultrasound.

## 2020-06-05 ENCOUNTER — NURSE TRIAGE (OUTPATIENT)
Dept: NURSING | Facility: CLINIC | Age: 30
End: 2020-06-05

## 2020-06-05 NOTE — TELEPHONE ENCOUNTER
"Patient is calling requesting COVID serologic antibody testing.  NOTE: Serologic testing is a blood test for 'antibodies' which are made at 10-14 days after you have had symptoms of COVID or were exposed and had an asymptomatic infection.  This does NOT test you for 'active' infection or tell you if you are contagious.    Are you a healthcare worker?  No  Do you currently have a cough, fever, body aches, shortness of breath, or difficulty breathing?  No  Did you previously have cough, fever, body aches, shortness of breath, or difficulty breathing that have now resolved? No previous covid symptoms.   Have you been exposed to (or come into close contact with) someone who tested POSITIVE for COVID-19 or someone who had a possible case of COVID-19?  Possible exposure 0 days ago.  Possible exposure < 14 days ago.  Recommend they wait for testing until it has been 14 days as it can take 2 weeks after exposure for the test to be positive.      The patient was informed: \"Testing is limited each day and it may take time for testing to be available to everyone who has called. You will receive a call within 48-72 hours to schedule the serology testing. Please confirm the best number to reach you is [phone]. If you have any questions about scheduling, call [phone].\"       Reason for call;  Pt called for covid serology test but advised to call back after >14 days since exposure. No Symptoms to triage .  Exposure was with Pt's daughter   Recommendation / teaching ; COVID 19 Nurse Triage Plan/Patient Instructions    Please be aware that novel coronavirus (COVID-19) may be circulating in the community. If you develop symptoms such as fever, cough, or SOB or if you have concerns about the presence of another infection including coronavirus (COVID-19), please contact your health care provider or visit [email or web address].     Disposition/Instructions    Additional COVID19 information to add for patients.   How can I protect " others?  If you have symptoms (fever, cough, body aches or trouble breathing): Stay home and away from others (self-isolate) until:    At least 10 days have passed since your symptoms started. And     You ve had no fever--and no medicine that reduces fever--for 3 full days (72 hours). And      Your other symptoms have resolved (gotten better).     If you don t have symptoms, but a test showed that you have COVID-19 (you tested positive):    Stay home and away from others (self-isolate) until at least 10 days have passed since the date of your first positive COVID-19 test.    During this time:    Stay in your own room, even for meals. Use your own bathroom if you can.     Stay away from others in your home. No hugging, kissing or shaking hands. No visitors.    Don t go to work, school or anywhere else.     Clean  high touch  surfaces often (doorknobs, counters, handles, etc.). Use a household cleaning spray or wipes. You ll find a full list on the EPA website:  www.epa.gov/pesticide-registration/list-n-disinfectants-use-against-sars-cov-2.    Cover your mouth and nose with a mask, tissue or washcloth to avoid spreading germs.    Wash your hands and face often. Use soap and water.    Caregivers in these groups are at risk for severe illness due to COVID-19:  o People 65 years and older  o People who live in a nursing home or long-term care facility  o People with chronic disease (lung, heart, cancer, diabetes, kidney, liver, immunologic)  o People who have a weakened immune system, including those who:  - Are in cancer treatment  - Take medicine that weakens the immune system, such as corticosteroids  - Had a bone marrow or organ transplant  - Have an immune deficiency  - Have poorly controlled HIV or AIDS  - Are obese (body mass index of 40 or higher)  - Smoke regularly    Caregivers should wear gloves while washing dishes, handling laundry and cleaning bedrooms and bathrooms.    Use caution when washing and drying  laundry: Don t shake dirty laundry, and use the warmest water setting that you can.    For more tips, go to www.cdc.gov/coronavirus/2019-ncov/downloads/10Things.pdf.    How can I take care of myself?  1. Get lots of rest. Drink extra fluids (unless a doctor has told you not to).     2. Take Tylenol (acetaminophen) for fever or pain. If you have liver or kidney problems, ask your family doctor if it s okay to take Tylenol.     Adults can take either:     650 mg (two 325 mg pills) every 4 to 6 hours, or     1,000 mg (two 500 mg pills) every 8 hours as needed.     Note: Don t take more than 3,000 mg in one day.   Acetaminophen is found in many medicines (both prescribed and over-the-counter medicines). Read all labels to be sure you don t take too much.     For children, check the Tylenol bottle for the right dose. The dose is based on the child s age or weight.    3. If you have other health problems (like cancer, heart failure, an organ transplant or severe kidney disease): Call your specialty clinic if you don t feel better in the next 2 days.    4. Know when to call 911: Emergency warning signs include:    Trouble breathing or shortness of breath    Pain or pressure in the chest that doesn t go away    Feeling confused like you haven t felt before, or not being able to wake up    Bluish-colored lips or face    What are the symptoms of COVID-19?     The most common symptoms are cough, fever and trouble breathing.     Less common symptoms include body aches, chills, diarrhea (loose, watery poops), fatigue (feeling very tired), headache, runny nose, sore throat and loss of smell.    COVID-19 can cause severe coughing (bronchitis) and lung infection (pneumonia).    How does it spread?     The virus may spread when a person coughs or sneezes into the air. The virus can travel about 6 feet this way, and it can live on surfaces.      Common  (household disinfectants) will kill the virus.    Who is at risk?  Anyone  can catch COVID-19 if they re around someone who has the virus.    How can others protect themselves?     Stay away from people who have COVID-19 (or symptoms of COVID-19).    Wash hands often with soap and water. Or, use hand  with at least 60% alcohol.    Avoid touching the eyes, nose or mouth.     Wear a face mask when you go out in public, when sick or when caring for a sick person.    Where can I get more information?    [facility]: About COVID-19: [email or web address]    CDC: What to Do If You re Sick: www.cdc.gov/coronavirus/2019-ncov/about/steps-when-sick.html    CDC: Ending Home Isolation: www.cdc.gov/coronavirus/2019-ncov/hcp/disposition-in-home-patients.html     CDC: Caring for Someone: www.cdc.gov/coronavirus/2019-ncov/if-you-are-sick/care-for-someone.html     [facility]: Interim Guidance for Hospital Discharge to Home: www.[facility].mn./diseases/coronavirus/hcp/hospdischarge.pdf    [facility] clinical trials (COVID-19 research studies): clinicalaffairs.[facility]-clinical-trials     Below are the COVID-19 hotlines at the [place] Department of Health ([facility]). Interpreters are available.   o For health questions: Call 741-361-5142 or 1-968.487.3100 (7 a.m. to 7 p.m.)  o For questions about schools and childcare: Call 015-777-4073 or 1-431.980.1140 (7 a.m. to 7 p.m.)          Thank you for taking steps to prevent the spread of this virus.  o Limit your contact with others.  o Wear a simple mask to cover your cough.  o Wash your hands well and often.    Resources    [facility]: About COVID-19: [email or web address]    CDC: What to Do If You're Sick: www.cdc.gov/coronavirus/2019-ncov/about/steps-when-sick.html    CDC: Ending Home Isolation: www.cdc.gov/coronavirus/2019-ncov/hcp/disposition-in-home-patients.html     CDC: Caring for Someone: www.cdc.gov/coronavirus/2019-ncov/if-you-are-sick/care-for-someone.html     [facility]: Interim Guidance for Hospital  Discharge to Home: www.health.[facility].mn.us/diseases/coronavirus/hcp/hospdischarge.pdf    [facility] clinical trials (COVID-19 research studies): clinicalaffairs.[facility]/n-clinical-trials     Below are the COVID-19 hotlines at the [place] Department of [facility] ([facility]). Interpreters are available.   o For health questions: Call 505-373-1061 or 1-526.355.6435 (7 a.m. to 7 p.m.)  o For questions about schools and childcare: Call 390-015-3226 or 1-905.788.9524 (7 a.m. to 7 p.m.)                       Caller Verbalizes understanding and denies further questions and will call back if further symptoms to triage or questions  .  Danyelle Dodd RN  - [place] Nurse Advisor

## 2020-11-08 ENCOUNTER — HEALTH MAINTENANCE LETTER (OUTPATIENT)
Age: 30
End: 2020-11-08

## 2021-01-15 ENCOUNTER — HEALTH MAINTENANCE LETTER (OUTPATIENT)
Age: 31
End: 2021-01-15

## 2021-04-17 ENCOUNTER — IMMUNIZATION (OUTPATIENT)
Dept: NURSING | Facility: CLINIC | Age: 31
End: 2021-04-17
Payer: COMMERCIAL

## 2021-04-17 PROCEDURE — 0011A PR COVID VAC MODERNA 100 MCG/0.5 ML IM: CPT

## 2021-04-17 PROCEDURE — 91301 PR COVID VAC MODERNA 100 MCG/0.5 ML IM: CPT

## 2021-05-15 ENCOUNTER — IMMUNIZATION (OUTPATIENT)
Dept: NURSING | Facility: CLINIC | Age: 31
End: 2021-05-15
Attending: INTERNAL MEDICINE
Payer: COMMERCIAL

## 2021-05-15 PROCEDURE — 0012A PR COVID VAC MODERNA 100 MCG/0.5 ML IM: CPT

## 2021-05-15 PROCEDURE — 91301 PR COVID VAC MODERNA 100 MCG/0.5 ML IM: CPT

## 2021-08-30 NOTE — PATIENT INSTRUCTIONS
Ask your psychiatrist about mirtazapine (Remeron) which can help appetite, sleep, and mood/anxiety.    Did you know?   I do telehealth (video) visits exclusively on Wednesdays.  I still do in-person visits at [facility] ([phone]) on Mondays, Tuesdays and Thursdays.  You can schedule a video visit for follow-up appointments as well as future appointments for certain conditions.  Please see the below link.  [email or web address]        Preventive Health Recommendations  Female Ages 26 - 39  Yearly exam:   See your health care provider every year in order to    Review health changes.     Discuss preventive care.      Review your medicines if you your doctor has prescribed any.    Until age 30: Get a Pap test every three years (more often if you have had an abnormal result).    After age 30: Talk to your doctor about whether you should have a Pap test every 3 years or have a Pap test with HPV screening every 5 years.   You do not need a Pap test if your uterus was removed (hysterectomy) and you have not had cancer.  You should be tested each year for STDs (sexually transmitted diseases), if you're at risk.   Talk to your provider about how often to have your cholesterol checked.  If you are at risk for diabetes, you should have a diabetes test (fasting glucose).  Shots: Get a flu shot each year. Get a tetanus shot every 10 years.   Nutrition:     Eat at least 5 servings of fruits and vegetables each day.    Eat whole-grain bread, whole-wheat pasta and brown rice instead of white grains and rice.    Get adequate Calcium and Vitamin D.     Lifestyle    Exercise at least 150 minutes a week (30 minutes a day, 5 days of the week). This will help you control your weight and prevent disease.    Limit alcohol to one drink per day.    No smoking.     Wear sunscreen to prevent skin cancer.    See your dentist every six months for an exam and cleaning.

## 2021-08-31 ENCOUNTER — OFFICE VISIT (OUTPATIENT)
Dept: FAMILY MEDICINE | Facility: CLINIC | Age: 31
End: 2021-08-31
Payer: COMMERCIAL

## 2021-08-31 ENCOUNTER — TELEPHONE (OUTPATIENT)
Dept: FAMILY MEDICINE | Facility: CLINIC | Age: 31
End: 2021-08-31

## 2021-08-31 VITALS
DIASTOLIC BLOOD PRESSURE: 74 MMHG | HEART RATE: 110 BPM | TEMPERATURE: 98 F | RESPIRATION RATE: 16 BRPM | BODY MASS INDEX: 18.15 KG/M2 | WEIGHT: 96.12 LBS | OXYGEN SATURATION: 98 % | SYSTOLIC BLOOD PRESSURE: 104 MMHG | HEIGHT: 61 IN

## 2021-08-31 DIAGNOSIS — Z11.3 ROUTINE SCREENING FOR STI (SEXUALLY TRANSMITTED INFECTION): ICD-10-CM

## 2021-08-31 DIAGNOSIS — Z00.00 ROUTINE GENERAL MEDICAL EXAMINATION AT A HEALTH CARE FACILITY: Primary | ICD-10-CM

## 2021-08-31 DIAGNOSIS — R63.4 UNINTENTIONAL WEIGHT LOSS: ICD-10-CM

## 2021-08-31 DIAGNOSIS — N92.6 MISSED MENSES: ICD-10-CM

## 2021-08-31 LAB
BASOPHILS # BLD AUTO: 0 10E3/UL (ref 0–0.2)
BASOPHILS NFR BLD AUTO: 0 %
EOSINOPHIL # BLD AUTO: 0 10E3/UL (ref 0–0.7)
EOSINOPHIL NFR BLD AUTO: 0 %
ERYTHROCYTE [DISTWIDTH] IN BLOOD BY AUTOMATED COUNT: 12.9 % (ref 10–15)
HCG UR QL: NEGATIVE
HCT VFR BLD AUTO: 44 % (ref 35–47)
HGB BLD-MCNC: 14.8 G/DL (ref 11.7–15.7)
IMM GRANULOCYTES # BLD: 0 10E3/UL
IMM GRANULOCYTES NFR BLD: 0 %
LYMPHOCYTES # BLD AUTO: 1.6 10E3/UL (ref 0.8–5.3)
LYMPHOCYTES NFR BLD AUTO: 17 %
MCH RBC QN AUTO: 31.3 PG (ref 26.5–33)
MCHC RBC AUTO-ENTMCNC: 33.6 G/DL (ref 31.5–36.5)
MCV RBC AUTO: 93 FL (ref 78–100)
MONOCYTES # BLD AUTO: 0.5 10E3/UL (ref 0–1.3)
MONOCYTES NFR BLD AUTO: 5 %
NEUTROPHILS # BLD AUTO: 7.6 10E3/UL (ref 1.6–8.3)
NEUTROPHILS NFR BLD AUTO: 77 %
PLATELET # BLD AUTO: 284 10E3/UL (ref 150–450)
RBC # BLD AUTO: 4.73 10E6/UL (ref 3.8–5.2)
WBC # BLD AUTO: 9.8 10E3/UL (ref 4–11)

## 2021-08-31 PROCEDURE — 36415 COLL VENOUS BLD VENIPUNCTURE: CPT | Performed by: FAMILY MEDICINE

## 2021-08-31 PROCEDURE — 86780 TREPONEMA PALLIDUM: CPT | Performed by: FAMILY MEDICINE

## 2021-08-31 PROCEDURE — 81025 URINE PREGNANCY TEST: CPT | Performed by: FAMILY MEDICINE

## 2021-08-31 PROCEDURE — 87591 N.GONORRHOEAE DNA AMP PROB: CPT | Performed by: FAMILY MEDICINE

## 2021-08-31 PROCEDURE — 87389 HIV-1 AG W/HIV-1&-2 AB AG IA: CPT | Performed by: FAMILY MEDICINE

## 2021-08-31 PROCEDURE — 80050 GENERAL HEALTH PANEL: CPT | Performed by: FAMILY MEDICINE

## 2021-08-31 PROCEDURE — 87491 CHLMYD TRACH DNA AMP PROBE: CPT | Performed by: FAMILY MEDICINE

## 2021-08-31 PROCEDURE — 86803 HEPATITIS C AB TEST: CPT | Performed by: FAMILY MEDICINE

## 2021-08-31 PROCEDURE — 99395 PREV VISIT EST AGE 18-39: CPT | Performed by: FAMILY MEDICINE

## 2021-08-31 PROCEDURE — 99213 OFFICE O/P EST LOW 20 MIN: CPT | Mod: 25 | Performed by: FAMILY MEDICINE

## 2021-08-31 RX ORDER — HYDROXYZINE HYDROCHLORIDE 10 MG/1
TABLET, FILM COATED ORAL
COMMUNITY
Start: 2021-08-30 | End: 2021-12-15

## 2021-08-31 ASSESSMENT — ENCOUNTER SYMPTOMS
DYSURIA: 0
CONSTIPATION: 0
PALPITATIONS: 1
SHORTNESS OF BREATH: 1
HEMATURIA: 0
EYE PAIN: 0
HEADACHES: 0
SORE THROAT: 0
ABDOMINAL PAIN: 0
HEMATOCHEZIA: 0
DIZZINESS: 1
PARESTHESIAS: 0
NAUSEA: 1
JOINT SWELLING: 0
MYALGIAS: 0
ARTHRALGIAS: 0
WEAKNESS: 1
COUGH: 0
DIARRHEA: 0
HEARTBURN: 0
NERVOUS/ANXIOUS: 1
CHILLS: 0
FEVER: 0
FREQUENCY: 0

## 2021-08-31 ASSESSMENT — COLUMBIA-SUICIDE SEVERITY RATING SCALE - C-SSRS
1. WITHIN THE PAST MONTH, HAVE YOU WISHED YOU WERE DEAD OR WISHED YOU COULD GO TO SLEEP AND NOT WAKE UP?: NO
6. HAVE YOU EVER DONE ANYTHING, STARTED TO DO ANYTHING, OR PREPARED TO DO ANYTHING TO END YOUR LIFE?: NO
2. IN THE PAST MONTH, HAVE YOU ACTUALLY HAD ANY THOUGHTS OF KILLING YOURSELF?: NO

## 2021-08-31 ASSESSMENT — ANXIETY QUESTIONNAIRES
5. BEING SO RESTLESS THAT IT IS HARD TO SIT STILL: NEARLY EVERY DAY
8. IF YOU CHECKED OFF ANY PROBLEMS, HOW DIFFICULT HAVE THESE MADE IT FOR YOU TO DO YOUR WORK, TAKE CARE OF THINGS AT HOME, OR GET ALONG WITH OTHER PEOPLE?: VERY DIFFICULT
2. NOT BEING ABLE TO STOP OR CONTROL WORRYING: NEARLY EVERY DAY
6. BECOMING EASILY ANNOYED OR IRRITABLE: MORE THAN HALF THE DAYS
3. WORRYING TOO MUCH ABOUT DIFFERENT THINGS: NEARLY EVERY DAY
GAD7 TOTAL SCORE: 20
7. FEELING AFRAID AS IF SOMETHING AWFUL MIGHT HAPPEN: NEARLY EVERY DAY
4. TROUBLE RELAXING: NEARLY EVERY DAY
1. FEELING NERVOUS, ANXIOUS, OR ON EDGE: NEARLY EVERY DAY
GAD7 TOTAL SCORE: 20
7. FEELING AFRAID AS IF SOMETHING AWFUL MIGHT HAPPEN: NEARLY EVERY DAY
GAD7 TOTAL SCORE: 20

## 2021-08-31 ASSESSMENT — PATIENT HEALTH QUESTIONNAIRE - PHQ9
10. IF YOU CHECKED OFF ANY PROBLEMS, HOW DIFFICULT HAVE THESE PROBLEMS MADE IT FOR YOU TO DO YOUR WORK, TAKE CARE OF THINGS AT HOME, OR GET ALONG WITH OTHER PEOPLE: VERY DIFFICULT
SUM OF ALL RESPONSES TO PHQ QUESTIONS 1-9: 12
SUM OF ALL RESPONSES TO PHQ QUESTIONS 1-9: 12

## 2021-08-31 ASSESSMENT — MIFFLIN-ST. JEOR: SCORE: 1107.34

## 2021-08-31 NOTE — RESULT ENCOUNTER NOTE
Kameron Womack,  It was nice to meet you.  Your urine pregnancy test was negative today and your blood counts were normal.      Maura Sun MD

## 2021-08-31 NOTE — TELEPHONE ENCOUNTER
"I saw this patient for a routine preventive visit (\"annual physical\") this morning.  Her health maintenance did not indicate she was due for a pap smear but after she left I noticed that someone had previously discontinued the pap screening modifier.  I don't know why.      Please clarify with patient if she is getting pap smears elsewhere (where?).  If not she is quite overdue for cervical cancer screening (as we show no prior pap smears in the [facility] system).  Please have her schedule a return appointment for pap only.      "

## 2021-08-31 NOTE — PROGRESS NOTES
SUBJECTIVE:   CC: Shanta Edmondson is an 30 year old woman who presents for preventive health visit.       Patient has been advised of split billing requirements and indicates understanding: Yes  Healthy Habits:     Getting at least 3 servings of Calcium per day:  NO    Bi-annual eye exam:  NO    Dental care twice a year:  Yes    Sleep apnea or symptoms of sleep apnea:  None    Diet:  Regular (no restrictions)    Frequency of exercise:  None    Taking medications regularly:  Yes    Medication side effects:  Not applicable and None    PHQ-2 Total Score: 5    Additional concerns today:  Yes (weight loss, loss about 21lbs, no appetite.)    Unintentional weight loss  Has been struggling with mood and anxiety and feels her loss of appetite is associated.  She has been seeing psychiatry and therapist.  Feels her therapist is a good match for her.  She tried sertraline but did not tolerate it.  Her psychiatrist has prescribed scheduled hydroxyzine which she has not yet started.    Wt Readings from Last 5 Encounters:   08/31/21 43.6 kg (96 lb 1.9 oz)   12/30/19 55.8 kg (123 lb)   10/21/19 54.4 kg (120 lb)   10/02/19 54.5 kg (120 lb 4 oz)   09/07/19 54.4 kg (120 lb)        Today's PHQ-2 Score:   PHQ-2 ( 1999 Pfizer) 8/31/2021   Q1: Little interest or pleasure in doing things 3   Q2: Feeling down, depressed or hopeless 2   PHQ-2 Score 5   Q1: Little interest or pleasure in doing things Nearly every day   Q2: Feeling down, depressed or hopeless More than half the days   PHQ-2 Score 5     PHQ 8/31/2021   PHQ-9 Total Score 12   Q9: Thoughts of better off dead/self-harm past 2 weeks Several days   F/U: Thoughts of suicide or self-harm No   F/U: Safety concerns No     BERTHA-7 SCORE 8/31/2021   Total Score 20 (severe anxiety)   Total Score 20     Answers for HPI/ROS submitted by the patient on 8/31/2021  If you checked off any problems, how difficult have these problems made it for you to do your work, take care of things  at home, or get along with other people?: Very difficult  PHQ9 TOTAL SCORE: 12  BERTHA 7 TOTAL SCORE: 20    Depression Screening Follow Up    C-SSRS ([place]) 8/31/2021   Within the last month, have you wished you were dead or wished you could go to sleep and not wake up? No   Within the last month, have you had any actual thoughts of killing yourself? No   Within the last month, have you ever done anything, started to do anything, or prepared to do anything to end your life? No       Follow Up     Follow Up Actions Taken  Crisis resource information provided in the After Visit Summary  Referred patient back to mental health provider      Abuse: Current or Past (Physical, Sexual or Emotional) - No  Do you feel safe in your environment? Yes    Have you ever done Advance Care Planning? (For example, a Health Directive, POLST, or a discussion with a medical provider or your loved ones about your wishes): No, advance care planning information given to patient to review.  Patient plans to discuss their wishes with loved ones or provider.      Social History     Tobacco Use     Smoking status: Current Some Day Smoker     Packs/day: 0.00     Smokeless tobacco: Never Used     Tobacco comment: 1 cig a month   Substance Use Topics     Alcohol use: Yes     Alcohol/week: 0.0 standard drinks     Comment: once a year      If you drink alcohol do you typically have >3 drinks per day or >7 drinks per week? No    Alcohol Use 8/31/2021   Prescreen: >3 drinks/day or >7 drinks/week? No   Prescreen: >3 drinks/day or >7 drinks/week? -   No flowsheet data found.    Reviewed orders with patient.  Reviewed health maintenance and updated orders accordingly - Yes  BP Readings from Last 3 Encounters:   08/31/21 104/74   12/30/19 102/62   10/21/19 98/60    Wt Readings from Last 3 Encounters:   08/31/21 43.6 kg (96 lb 1.9 oz)   12/30/19 55.8 kg (123 lb)   10/21/19 54.4 kg (120 lb)              Breast Cancer Screening:    Breast CA Risk  "Assessment (FHS-7) 2021   Do you have a family history of breast, colon, or ovarian cancer? No / Unknown         Patient under 40 years of age: Routine Mammogram Screening not recommended.   Pertinent mammograms are reviewed under the imaging tab.    History of abnormal Pap smear: NO - age 21-29 PAP every 3 years recommended     Reviewed and updated as needed this visit by clinical staff  Tobacco  Allergies  Meds  Problems  Med Hx  Surg Hx  Fam Hx  Soc Hx        Reviewed and updated as needed this visit by Provider    Meds  Problems            Past Medical History:   Diagnosis Date     History of chicken pox       Past Surgical History:   Procedure Laterality Date     NO HISTORY OF SURGERY       OB History    Para Term  AB Living   0 0 0 0 0 0   SAB TAB Ectopic Multiple Live Births   0 0 0 0 0       Review of Systems   Constitutional: Negative for chills and fever.   HENT: Negative for congestion, ear pain, hearing loss and sore throat.    Eyes: Negative for pain and visual disturbance.   Respiratory: Positive for shortness of breath. Negative for cough.    Cardiovascular: Positive for palpitations. Negative for chest pain and peripheral edema.   Gastrointestinal: Positive for nausea. Negative for abdominal pain, constipation, diarrhea, heartburn and hematochezia.   Genitourinary: Negative for dysuria, frequency, genital sores, hematuria and urgency.   Musculoskeletal: Negative for arthralgias, joint swelling and myalgias.   Skin: Negative for rash.   Neurological: Positive for dizziness and weakness. Negative for headaches and paresthesias.   Psychiatric/Behavioral: Positive for mood changes. The patient is nervous/anxious.           OBJECTIVE:   /74 (BP Location: Right arm, Patient Position: Sitting, Cuff Size: Child)   Pulse 110   Temp 98  F (36.7  C) (Oral)   Resp 16   Ht 1.556 m (5' 1.25\")   Wt 43.6 kg (96 lb 1.9 oz)   LMP 2021   SpO2 98%   BMI 18.01 kg/m  "   Physical Exam  GENERAL APPEARANCE: healthy, alert and no distress  EYES: Eyes grossly normal to inspection, conjunctivae and sclerae normal  HENT: ear canals and TM's normal  NECK: no adenopathy, no asymmetry, masses, or scars and thyroid normal to palpation  RESP: lungs clear to auscultation - no rales, rhonchi or wheezes  CV: regular rate and rhythm, normal S1 S2, no S3 or S4, no murmur, click or rub, no peripheral edema   ABDOMEN: soft, nontender, no hepatosplenomegaly, no masses   MS: no musculoskeletal defects are noted and gait is age appropriate without ataxia  SKIN: no suspicious lesions or rashes  NEURO: Normal strength and tone, sensory exam grossly normal, mentation intact and speech normal  PSYCH: mentation appears normal and affect normal/bright       ASSESSMENT/PLAN:     Routine general medical examination at a health care facility  Unfortunately pap smears were discontinued in her HM modifiers so that did not come up as due.  We'll reach out to her and advise her to schedule an appointment for pap smear.      Unintentional weight loss  Likely related to depression/anxiety but we'll do work-up today to rule out thyroid disease, metabolic disease.  I recommended she ask her psychiatrist if mirtazapine may be an option for her.    - TSH with free T4 reflex  - CBC with platelets and differential  - Comprehensive metabolic panel (BMP + Alb, Alk Phos, ALT, AST, Total. Bili, TP)      Missed menses  Discussed contraception including options of Nexplanon and IUD.    - HCG Qual, Urine ([id number])      Routine screening for STI (sexually transmitted infection)  - HIV Antigen Antibody Combo  - Hepatitis C Screen Reflex to HCV RNA Quant and Genotype  - Treponema Abs w Reflex to RPR and Titer  - NEISSERIA GONORRHOEA PCR  - CHLAMYDIA TRACHOMATIS PCR         COUNSELING:  Reviewed preventive health counseling, as reflected in patient instructions    Estimated body mass index is 18.01 kg/m  as calculated from the  "following:    Height as of this encounter: 1.556 m (5' 1.25\").    Weight as of this encounter: 43.6 kg (96 lb 1.9 oz).    She reports that she has been smoking. She has been smoking about 0.00 packs per day. She has never used smokeless tobacco.      Counseling Resources:  ATP IV Guidelines  Pooled Cohorts Equation Calculator  Breast Cancer Risk Calculator  BRCA-Related Cancer Risk Assessment: FHS-7 Tool  FRAX Risk Assessment  ICSI Preventive Guidelines  Dietary Guidelines for Americans, 2010  USDA's MyPlate  ASA Prophylaxis  Lung CA Screening    Maura Sun MD  [facility]        "

## 2021-09-01 LAB
ALBUMIN SERPL-MCNC: 4 G/DL (ref 3.4–5)
ALP SERPL-CCNC: 70 U/L (ref 40–150)
ALT SERPL W P-5'-P-CCNC: 10 U/L (ref 0–50)
ANION GAP SERPL CALCULATED.3IONS-SCNC: 7 MMOL/L (ref 3–14)
AST SERPL W P-5'-P-CCNC: 11 U/L (ref 0–45)
BILIRUB SERPL-MCNC: 1 MG/DL (ref 0.2–1.3)
BUN SERPL-MCNC: 14 MG/DL (ref 7–30)
C TRACH DNA SPEC QL NAA+PROBE: NEGATIVE
CALCIUM SERPL-MCNC: 9.2 MG/DL (ref 8.5–10.1)
CHLORIDE BLD-SCNC: 104 MMOL/L (ref 94–109)
CO2 SERPL-SCNC: 23 MMOL/L (ref 20–32)
CREAT SERPL-MCNC: 0.73 MG/DL (ref 0.52–1.04)
GFR SERPL CREATININE-BSD FRML MDRD: >90 ML/MIN/1.73M2
GLUCOSE BLD-MCNC: 75 MG/DL (ref 70–99)
HCV AB SERPL QL IA: NONREACTIVE
HIV 1+2 AB+HIV1 P24 AG SERPL QL IA: NONREACTIVE
N GONORRHOEA DNA SPEC QL NAA+PROBE: NEGATIVE
POTASSIUM BLD-SCNC: 4.1 MMOL/L (ref 3.4–5.3)
PROT SERPL-MCNC: 7.7 G/DL (ref 6.8–8.8)
SODIUM SERPL-SCNC: 134 MMOL/L (ref 133–144)
T PALLIDUM AB SER QL: NONREACTIVE
TSH SERPL DL<=0.005 MIU/L-ACNC: 0.48 MU/L (ref 0.4–4)

## 2021-09-01 ASSESSMENT — PATIENT HEALTH QUESTIONNAIRE - PHQ9: SUM OF ALL RESPONSES TO PHQ QUESTIONS 1-9: 12

## 2021-09-01 ASSESSMENT — ANXIETY QUESTIONNAIRES: GAD7 TOTAL SCORE: 20

## 2021-09-01 NOTE — TELEPHONE ENCOUNTER
I talked to pt.  She has not been getting pap smears.  Made a pap only appt with Dr. Sun.  ALYSHA Ponce

## 2021-09-03 NOTE — RESULT ENCOUNTER NOTE
Kameron Womack,  Your test results were all normal.  Please continue with your current treatment plan and let's continue to monitor your weight and appetite.    I'm so sorry about missing that you were due for a pap smear.  Thank you for scheduling a return appointment for that.    Maura Sun MD

## 2021-09-11 ENCOUNTER — HEALTH MAINTENANCE LETTER (OUTPATIENT)
Age: 31
End: 2021-09-11

## 2021-10-18 ENCOUNTER — MYC MEDICAL ADVICE (OUTPATIENT)
Dept: FAMILY MEDICINE | Facility: CLINIC | Age: 31
End: 2021-10-18

## 2021-10-20 ENCOUNTER — MYC MEDICAL ADVICE (OUTPATIENT)
Dept: FAMILY MEDICINE | Facility: CLINIC | Age: 31
End: 2021-10-20

## 2021-10-20 DIAGNOSIS — R63.0 LOSS OF APPETITE: Primary | ICD-10-CM

## 2021-10-21 NOTE — TELEPHONE ENCOUNTER
OK to provide referral to nutritionist but advise her that insurance will not likely cover that visit for this indication.  If unintentional weight loss persists she should schedule a visit with me.  Maura Sun MD

## 2021-10-21 NOTE — TELEPHONE ENCOUNTER
Nutrition referral ordered.    Writer responded via [employer].    AUBRIE StroudN, RN  [facility]

## 2021-10-21 NOTE — TELEPHONE ENCOUNTER
Dr. Sun-Please review and advise:  1. Patient requesting nutrition referral.   A. Would it be reasonable to associate loss of appetite as reason for referral/associated diagnosis?  2. Do you have any concerns about patient losing weight?   A. Weight recorded as 96 lb at 8/31/21 office visit    Thank you!  AUBRIE StroudN, RN  [facility]

## 2021-11-13 ENCOUNTER — OFFICE VISIT (OUTPATIENT)
Dept: URGENT CARE | Facility: URGENT CARE | Age: 31
End: 2021-11-13
Payer: COMMERCIAL

## 2021-11-13 VITALS
SYSTOLIC BLOOD PRESSURE: 102 MMHG | WEIGHT: 97 LBS | HEART RATE: 83 BPM | DIASTOLIC BLOOD PRESSURE: 72 MMHG | TEMPERATURE: 98.2 F | HEIGHT: 61 IN | OXYGEN SATURATION: 98 % | BODY MASS INDEX: 18.31 KG/M2

## 2021-11-13 DIAGNOSIS — N89.8 VAGINAL ODOR: Primary | ICD-10-CM

## 2021-11-13 DIAGNOSIS — N76.0 BV (BACTERIAL VAGINOSIS): ICD-10-CM

## 2021-11-13 DIAGNOSIS — B96.89 BV (BACTERIAL VAGINOSIS): ICD-10-CM

## 2021-11-13 LAB
ALBUMIN UR-MCNC: NEGATIVE MG/DL
APPEARANCE UR: CLEAR
BILIRUB UR QL STRIP: NEGATIVE
CLUE CELLS: PRESENT
COLOR UR AUTO: YELLOW
GLUCOSE UR STRIP-MCNC: NEGATIVE MG/DL
HGB UR QL STRIP: NEGATIVE
KETONES UR STRIP-MCNC: 80 MG/DL
LEUKOCYTE ESTERASE UR QL STRIP: NEGATIVE
NITRATE UR QL: NEGATIVE
PH UR STRIP: 7 [PH] (ref 5–7)
SP GR UR STRIP: 1.02 (ref 1–1.03)
TRICHOMONAS, WET PREP: ABNORMAL
UROBILINOGEN UR STRIP-ACNC: 0.2 E.U./DL
WBC'S/HIGH POWER FIELD, WET PREP: ABNORMAL
YEAST, WET PREP: ABNORMAL

## 2021-11-13 PROCEDURE — 81003 URINALYSIS AUTO W/O SCOPE: CPT | Performed by: PHYSICIAN ASSISTANT

## 2021-11-13 PROCEDURE — 87491 CHLMYD TRACH DNA AMP PROBE: CPT | Performed by: PHYSICIAN ASSISTANT

## 2021-11-13 PROCEDURE — 99213 OFFICE O/P EST LOW 20 MIN: CPT | Performed by: PHYSICIAN ASSISTANT

## 2021-11-13 PROCEDURE — 87210 SMEAR WET MOUNT SALINE/INK: CPT | Performed by: PHYSICIAN ASSISTANT

## 2021-11-13 PROCEDURE — 87591 N.GONORRHOEAE DNA AMP PROB: CPT | Performed by: PHYSICIAN ASSISTANT

## 2021-11-13 RX ORDER — METRONIDAZOLE 500 MG/1
500 TABLET ORAL 2 TIMES DAILY
Qty: 14 TABLET | Refills: 0 | Status: SHIPPED | OUTPATIENT
Start: 2021-11-13 | End: 2021-11-20

## 2021-11-13 ASSESSMENT — MIFFLIN-ST. JEOR: SCORE: 1107.37

## 2021-11-13 NOTE — PATIENT INSTRUCTIONS
Patient Education     Bacterial Vaginosis    You have a vaginal infection called bacterial vaginosis (BV). Both good and bad bacteria are present in a healthy vagina. BV occurs when these bacteria get out of balance. The number of bad bacteria increase. And the number of good bacteria decrease. BV is linked with sexual activity, but it's not a sexually transmitted infection (STI).   BV may or may not cause symptoms. If symptoms do occur, they can include:     Thin, gray, milky-white, or sometimes green discharge    Unpleasant odor or  fishy  smell    Itching, burning, or pain in or around the vagina  It is not known what causes BV, but certain factors can make the problem more likely. These can include:     Douching    Spermicides    Use of antibiotics    Change in hormone levels with pregnancy, breastfeeding, or menopause    Having sex with a new partner    Having sex with more than one partner  BV will sometimes go away on its own. But treatment is often advised. This is because untreated BV can raise the risk of more serious health problems such as:     Pelvic inflammatory disease (PID)     delivery (giving birth to a baby early if you re pregnant)    HIV and some other sexually transmitted infections (STIs)    Infection after surgery on the reproductive organs  Home care  General care    BV is most often treated with medicines called antibiotics. These may be given as pills or as a vaginal cream. If antibiotics are prescribed, be sure to use them exactly as directed. And complete all of the medicine, even if your symptoms go away.    Don't douche or having sex during treatment.    If you have sex with a female partner, ask your healthcare provider if she should also be treated.  Prevention    Don't douche.    Don't have sex. If you do have sex, then take steps to lower your risk:  ? Use condoms when having sex.  ? Limit the number of sex partners you have.    Follow-up care  Follow up with your  healthcare provider, or as advised.   When to get medical advice  Call your healthcare provider right away if:     You have a fever of 100.4 F (38 C) or higher, or as directed by your provider.    Your symptoms get worse, or they don t go away within a few days of starting treatment.    You have new pain in the lower belly or pelvic region.    You have side effects that bother you or a reaction to the pills or cream you re prescribed.    You or any of your sex partners have new symptoms, such as a rash, joint pain, or sores.  [employer] last reviewed this educational content on 6/1/2020 2000-2021 [employer]. All rights reserved. This information is not intended as a substitute for professional medical care. Always follow your healthcare professional's instructions.

## 2021-11-13 NOTE — PROGRESS NOTES
"SUBJECTIVE:   Shanta Edmondson is a 30 year old female who  presents today for a possible vaginal odor and discharge for days.  No UTI symptoms.    Past Medical History:   Diagnosis Date     History of chicken pox      Current Outpatient Medications   Medication Sig Dispense Refill     metroNIDAZOLE (FLAGYL) 500 MG tablet Take 1 tablet (500 mg) by mouth 2 times daily for 7 days 14 tablet 0     hydrOXYzine (ATARAX) 10 MG tablet  (Patient not taking: Reported on 11/13/2021)       Social History     Tobacco Use     Smoking status: Current Some Day Smoker     Packs/day: 0.00     Smokeless tobacco: Never Used     Tobacco comment: 1 cig a month   Substance Use Topics     Alcohol use: Yes     Alcohol/week: 0.0 standard drinks     Comment: once a year        ROS:   Review of systems negative except as stated above.    OBJECTIVE:  /72   Pulse 83   Temp 98.2  F (36.8  C) (Temporal)   Ht 1.549 m (5' 1\")   Wt 44 kg (97 lb)   LMP 10/29/2021   SpO2 98%   BMI 18.33 kg/m    GENERAL APPEARANCE: healthy, alert and no distress  RESP: lungs clear to auscultation - no rales, rhonchi or wheezes  CV: regular rates and rhythm, normal S1 S2, no murmur noted  ABDOMEN:  soft, nontender, no HSM or masses and bowel sounds normal  BACK: No CVA tenderness  SKIN: no suspicious lesions or rashes  NEURO: Normal strength and tone, sensory exam grossly normal,  normal speech and mentation      Results for orders placed or performed in visit on 11/13/21   UA macro with reflex to Microscopic and Culture - Clinc Collect     Status: Abnormal    Specimen: Urine, Clean Catch   Result Value Ref Range    Color Urine Yellow Colorless, Straw, Light Yellow, Yellow    Appearance Urine Clear Clear    Glucose Urine Negative Negative mg/dL    Bilirubin Urine Negative Negative    Ketones Urine 80  (A) Negative mg/dL    Specific Gravity Urine 1.020 1.003 - 1.035    Blood Urine Negative Negative    pH Urine 7.0 5.0 - 7.0    Protein Albumin Urine " Negative Negative mg/dL    Urobilinogen Urine 0.2 0.2, 1.0 E.U./dL    Nitrite Urine Negative Negative    Leukocyte Esterase Urine Negative Negative    Narrative    Microscopic not indicated   Wet prep - Clinic Collect     Status: Abnormal    Specimen: Vagina; Swab   Result Value Ref Range    Trichomonas Absent Absent    Yeast Absent Absent    Clue Cells Present (A) Absent    WBCs/high power field 1+ (A) None       ASSESSMENT:   (N89.8) Vaginal odor  (primary encounter diagnosis)  Plan: Wet prep - Clinic Collect, Chlamydia         trachomatis PCR, Neisseria gonorrhoeae PCR, UA         macro with reflex to Microscopic and Culture -         Clinc Collect      (N76.0,  B96.89) BV (bacterial vaginosis)  Plan: metroNIDAZOLE (FLAGYL) 500 MG tablet      Red flags and emergent follow up discussed, and understood by patient  Follow up with PCP if symptoms worsen or fail to improve      Patient Instructions     Patient Education     Bacterial Vaginosis    You have a vaginal infection called bacterial vaginosis (BV). Both good and bad bacteria are present in a healthy vagina. BV occurs when these bacteria get out of balance. The number of bad bacteria increase. And the number of good bacteria decrease. BV is linked with sexual activity, but it's not a sexually transmitted infection (STI).   BV may or may not cause symptoms. If symptoms do occur, they can include:     Thin, gray, milky-white, or sometimes green discharge    Unpleasant odor or  fishy  smell    Itching, burning, or pain in or around the vagina  It is not known what causes BV, but certain factors can make the problem more likely. These can include:     Douching    Spermicides    Use of antibiotics    Change in hormone levels with pregnancy, breastfeeding, or menopause    Having sex with a new partner    Having sex with more than one partner  BV will sometimes go away on its own. But treatment is often advised. This is because untreated BV can raise the risk of more  serious health problems such as:     Pelvic inflammatory disease (PID)     delivery (giving birth to a baby early if you re pregnant)    HIV and some other sexually transmitted infections (STIs)    Infection after surgery on the reproductive organs  Home care  General care    BV is most often treated with medicines called antibiotics. These may be given as pills or as a vaginal cream. If antibiotics are prescribed, be sure to use them exactly as directed. And complete all of the medicine, even if your symptoms go away.    Don't douche or having sex during treatment.    If you have sex with a female partner, ask your healthcare provider if she should also be treated.  Prevention    Don't douche.    Don't have sex. If you do have sex, then take steps to lower your risk:  ? Use condoms when having sex.  ? Limit the number of sex partners you have.    Follow-up care  Follow up with your healthcare provider, or as advised.   When to get medical advice  Call your healthcare provider right away if:     You have a fever of 100.4 F (38 C) or higher, or as directed by your provider.    Your symptoms get worse, or they don t go away within a few days of starting treatment.    You have new pain in the lower belly or pelvic region.    You have side effects that bother you or a reaction to the pills or cream you re prescribed.    You or any of your sex partners have new symptoms, such as a rash, joint pain, or sores.  [employer] last reviewed this educational content on 2020-2021 [employer]. All rights reserved. This information is not intended as a substitute for professional medical care. Always follow your healthcare professional's instructions.

## 2021-11-14 LAB
C TRACH DNA SPEC QL NAA+PROBE: NEGATIVE
N GONORRHOEA DNA SPEC QL NAA+PROBE: NEGATIVE

## 2021-12-03 ENCOUNTER — VIRTUAL VISIT (OUTPATIENT)
Dept: ONCOLOGY | Facility: CLINIC | Age: 31
End: 2021-12-03
Attending: FAMILY MEDICINE
Payer: COMMERCIAL

## 2021-12-03 DIAGNOSIS — R63.0 LOSS OF APPETITE: ICD-10-CM

## 2021-12-03 PROCEDURE — 97802 MEDICAL NUTRITION INDIV IN: CPT | Mod: GT,95 | Performed by: DIETITIAN, REGISTERED

## 2021-12-03 PROCEDURE — 999N001193 HC VIDEO/TELEPHONE VISIT; NO CHARGE

## 2021-12-03 NOTE — LETTER
"    12/3/2021         RE: Shanta Edmondson  [address] [facility] [address]        Dear Colleague,    Thank you for referring your patient, Shanta Edmondson, to the [facility]. Please see a copy of my visit note below.    CLINICAL NUTRITION SERVICES - ASSESSMENT NOTE    Shanta Edmondson 31 year old referred for MNT related to loss of appetite, weight loss    Time Spent: 40 minutes (3 units)  Visit Type: video  Pt accompanied by: self  Referring Physician: MD Dino     NUTRITION HISTORY  Factors affecting nutrition intake include:decreased appetite  Current diet/appetite: general    Shanta's weight loss started around March of 2021.  She has lost over 20 lbs. Mostly related to stress, depression and anxiety.   Her eating habits tends to be disordered as she's not often hungry. She tends to mostly restrict and skip meals.   She doesn't eat until around noon.  Even then, she only has small portions of fruit, grapes, cookies etc.   When she does eat a normal meal she gets nausea.    She eats mostly fast foods versus cooking meals at home and she doesn't snack much.    She doesn't take nutriton shakes or supplements other than biotin.    She has been starting to get out of the house more (works from home) as her mood has improved. Otherwise, she hasn't really left her home since March.  She has not been exercising either since her weight has been low.    She expresses her desire to improve her health, weight trends and inquires about exercise.     Diet Recall  Breakfast skips   Lunch Fruit, cookies, some    Dinner Eat out frequently Chic filet, nuggets OR pizza   Snacks No routine snacking crystal covered frozen fruit nuggest   Beverages Mostly water or sparkling,      ANTHROPOMETRICS  Height: 61\"  Weight: 97 lb/44kg  BMI: 18  Weight Status:  Underweight BMI <18.5  IBW: 105 (97%)  Weight History:   Wt Readings from Last 7 Encounters:   11/13/21 44 kg (97 " lb)   08/31/21 43.6 kg (96 lb 1.9 oz)   12/30/19 55.8 kg (123 lb)   10/21/19 54.4 kg (120 lb)   10/02/19 54.5 kg (120 lb 4 oz)   09/07/19 54.4 kg (120 lb)   09/07/19 57.6 kg (127 lb)     Dosing Weight: 44kg    Medications/vitamins/minerals/herbals:   Reviewed - biotin    Labs:   Labs reviewed    NUTRITION FOCUSED PHYSICAL ASSESSMENT FOR DIAGNOSING MALNUTRITION:  Not observed due to Covid 19 pandemic - no clinic contact  Consult for education only      ASSESSED NUTRITION NEEDS:  Estimated Energy Needs: 1600 kcals (35 Kcal/Kg)  Justification: repletion  Estimated Protein Needs: 52 grams protein (1.2-1.5 g pro/Kg)  Justification: Repletion  Estimated Fluid Needs: 1500  mL   Justification: maintenance    MALNUTRITION:  % Weight Loss:  > 20% in 1 year (severe malnutrition)  % Intake:  <75% for >/= 3 months (moderate malnutrition)  Subcutaneous Fat Loss:  None observed  Muscle Loss:  None observed  Fluid Retention:  None noted    Malnutrition Diagnosis: Unable to determine but suspect moderate malnutrition    NUTRITION DIAGNOSIS:  Inadequate protein-energy intake related to decreased appetite with depression and anxiety as evidenced by 24% wt loss x past one year    INTERVENTIONS  Provided written & verbal education:     - Reviewed nutrition and hydration needs.   Advised pt to aim for at least 1600-1800kcal and 55-65g protein daily with desired exercise.   Advised pt to aim for 6-8 cups non-caffeine containing beverages (water/electrolytes) daily.    - Discussed strategies to help fortify meals and snacks. Reviewed sources of protein.   - Encouraged to have a protein source with each meal and snack.    - Encouraged to focus on small, frequent meals. .  - Encouraged to track daily intake to ensure goals are being reached.    - Reviewed oral nutrition supplement options (CIB with [employer] milk etc).   - Encouraged utilizing these ONS in home made shakes/smoothies to prevent flavor fatigue.  - Reviewed vitamins and  minerals. Advised her to start taking a general MVI and Vit D3 daily (1000-2000IU). Message sent to MD for vitamin D lab draw.  Will further advise vitamin D supplement prn.       Provided pt with corresponding education materials/handouts on:  High Calorie/High Protein Recipe booklet, Tips to Increase the Calories in Your Diet and Sources of Protein    Pt verbalize understanding of materials provided during consult.   Patient Understanding: good  Expected patient engagement: good     Implementation  Collaboration and Referral of Nutrition care - Message sent to Dr. Sun for vitamin D lab draw.    Goals  1.  Aim for 5-6 small frequent meals  2.  Aim for 1600-1700kcal and 55-65g protein/day  3. Weight gain towards 105 lbs IBW    Follow-Up Plans: Pt has RD contact information for questions.        Sarahi Lucas RD, LD

## 2021-12-03 NOTE — PATIENT INSTRUCTIONS
Kameron Womack,   It was nice meeting you today! Thank you for joining the appointment and prioritizing your nutrition to improve your health and weight.   I hope that our visit will help you continue to improve each day.   I have attached to your email the resources that I referred to during our conversation:   --High calorie/high protein recipe ideas   --Tips to increase calorie in your diet   --Source of protein   Here are your nutrition goals:   --Calories; 1076-4417/day   --Protein: 55-65g/day   --Take a Women's One a Day Multivitamin   --Take a Vitamin D3 supplement - 1000-2000IU/day (I will advise if you need to take more pending your Vitamin D lab draw)   Here is the link to Geekatoo milk:   https://[employer]ultra-filtered-milk/#ultra-filtered-milk?utm_source=annie&utm_medium=cpc&utm_campaign=2021_UFM_Brand_Phrase&utm_content=Whole%20Milk&utm_term=fair%20life%20whole%20milk  Lactose-Free Ultra-Filtered Milks  [employer] Milk   Enjoy delicious, nutrition-rich and lactose-free milk with 50% more protein and half the sugar. Enjoy one of the many varieties of [employer] ultra-filtered milks today.   [employer]   Please reach out with further nutrition questions along the way!   Be well,   Sarahi Lucas RD, LD   [facility]  Outpatient Services   [address] [place] [id number]   [email or web address]  [email or web address]   Office: [phone]  Fax: [phone]

## 2021-12-05 ENCOUNTER — TELEPHONE (OUTPATIENT)
Dept: FAMILY MEDICINE | Facility: CLINIC | Age: 31
End: 2021-12-05
Payer: COMMERCIAL

## 2021-12-05 DIAGNOSIS — Z13.228 SCREENING FOR ENDOCRINE, NUTRITIONAL, METABOLIC AND IMMUNITY DISORDER: Primary | ICD-10-CM

## 2021-12-05 DIAGNOSIS — Z13.0 SCREENING FOR ENDOCRINE, NUTRITIONAL, METABOLIC AND IMMUNITY DISORDER: Primary | ICD-10-CM

## 2021-12-05 DIAGNOSIS — Z13.29 SCREENING FOR ENDOCRINE, NUTRITIONAL, METABOLIC AND IMMUNITY DISORDER: Primary | ICD-10-CM

## 2021-12-05 DIAGNOSIS — Z13.21 SCREENING FOR ENDOCRINE, NUTRITIONAL, METABOLIC AND IMMUNITY DISORDER: Primary | ICD-10-CM

## 2021-12-05 NOTE — TELEPHONE ENCOUNTER
RD has recommended checking a Vitamin D level.  I placed order - please let patient know.  However, I do still recommend she see me regarding weight loss as the work-up for unexplained weight loss does include a lot more than just a Vitamin D level.  (She no-showed her most recent appt with me.)    Maura Sun MD

## 2021-12-06 NOTE — TELEPHONE ENCOUNTER
Writer called patient and reviewed message per Dr. Sun.    Patient verbalized understanding and in agreement with plan.    Lab appt scheduled on 12/11/21.  Lab visit date and time confirmed with patient.  Reviewed with patient she does not need to fast for this lab.    Video visit with Dr. Sun scheduled on 12/15/21.  Visit time and date confirmed with patient.    OZZY Stroud, RN  [facility]

## 2021-12-14 ENCOUNTER — LAB (OUTPATIENT)
Dept: LAB | Facility: CLINIC | Age: 31
End: 2021-12-14
Payer: COMMERCIAL

## 2021-12-14 DIAGNOSIS — R63.4 UNINTENTIONAL WEIGHT LOSS: ICD-10-CM

## 2021-12-14 DIAGNOSIS — Z13.228 SCREENING FOR ENDOCRINE, NUTRITIONAL, METABOLIC AND IMMUNITY DISORDER: ICD-10-CM

## 2021-12-14 DIAGNOSIS — Z13.0 SCREENING FOR ENDOCRINE, NUTRITIONAL, METABOLIC AND IMMUNITY DISORDER: ICD-10-CM

## 2021-12-14 DIAGNOSIS — Z13.29 SCREENING FOR ENDOCRINE, NUTRITIONAL, METABOLIC AND IMMUNITY DISORDER: ICD-10-CM

## 2021-12-14 DIAGNOSIS — Z13.21 SCREENING FOR ENDOCRINE, NUTRITIONAL, METABOLIC AND IMMUNITY DISORDER: ICD-10-CM

## 2021-12-14 LAB — DEPRECATED CALCIDIOL+CALCIFEROL SERPL-MC: 19 UG/L (ref 20–75)

## 2021-12-14 PROCEDURE — 36415 COLL VENOUS BLD VENIPUNCTURE: CPT

## 2021-12-14 PROCEDURE — 84443 ASSAY THYROID STIM HORMONE: CPT

## 2021-12-14 PROCEDURE — 80053 COMPREHEN METABOLIC PANEL: CPT

## 2021-12-14 PROCEDURE — 82306 VITAMIN D 25 HYDROXY: CPT

## 2021-12-15 ENCOUNTER — VIRTUAL VISIT (OUTPATIENT)
Dept: FAMILY MEDICINE | Facility: CLINIC | Age: 31
End: 2021-12-15
Payer: COMMERCIAL

## 2021-12-15 DIAGNOSIS — F41.1 GAD (GENERALIZED ANXIETY DISORDER): ICD-10-CM

## 2021-12-15 DIAGNOSIS — R63.4 UNINTENTIONAL WEIGHT LOSS: Primary | ICD-10-CM

## 2021-12-15 DIAGNOSIS — E55.9 VITAMIN D DEFICIENCY: ICD-10-CM

## 2021-12-15 DIAGNOSIS — F32.2 CURRENT SEVERE EPISODE OF MAJOR DEPRESSIVE DISORDER WITHOUT PSYCHOTIC FEATURES WITHOUT PRIOR EPISODE (H): ICD-10-CM

## 2021-12-15 LAB
ALBUMIN SERPL-MCNC: 3.7 G/DL (ref 3.4–5)
ALP SERPL-CCNC: 57 U/L (ref 40–150)
ALT SERPL W P-5'-P-CCNC: 10 U/L (ref 0–50)
ANION GAP SERPL CALCULATED.3IONS-SCNC: 3 MMOL/L (ref 3–14)
AST SERPL W P-5'-P-CCNC: 8 U/L (ref 0–45)
BILIRUB SERPL-MCNC: 0.6 MG/DL (ref 0.2–1.3)
BUN SERPL-MCNC: 11 MG/DL (ref 7–30)
CALCIUM SERPL-MCNC: 8.7 MG/DL (ref 8.5–10.1)
CHLORIDE BLD-SCNC: 110 MMOL/L (ref 94–109)
CO2 SERPL-SCNC: 29 MMOL/L (ref 20–32)
CREAT SERPL-MCNC: 0.72 MG/DL (ref 0.52–1.04)
GFR SERPL CREATININE-BSD FRML MDRD: >90 ML/MIN/1.73M2
GLUCOSE BLD-MCNC: 81 MG/DL (ref 70–99)
POTASSIUM BLD-SCNC: 3.8 MMOL/L (ref 3.4–5.3)
PROT SERPL-MCNC: 6.7 G/DL (ref 6.8–8.8)
SODIUM SERPL-SCNC: 142 MMOL/L (ref 133–144)
TSH SERPL DL<=0.005 MIU/L-ACNC: 0.93 MU/L (ref 0.4–4)

## 2021-12-15 PROCEDURE — 99214 OFFICE O/P EST MOD 30 MIN: CPT | Mod: 95 | Performed by: FAMILY MEDICINE

## 2021-12-15 RX ORDER — BUSPIRONE HYDROCHLORIDE 5 MG/1
5 TABLET ORAL 2 TIMES DAILY
Qty: 60 TABLET | Refills: 0 | Status: SHIPPED | OUTPATIENT
Start: 2021-12-15 | End: 2022-01-19

## 2021-12-15 ASSESSMENT — ANXIETY QUESTIONNAIRES
3. WORRYING TOO MUCH ABOUT DIFFERENT THINGS: NEARLY EVERY DAY
7. FEELING AFRAID AS IF SOMETHING AWFUL MIGHT HAPPEN: NEARLY EVERY DAY
GAD7 TOTAL SCORE: 21
GAD7 TOTAL SCORE: 21
4. TROUBLE RELAXING: NEARLY EVERY DAY
6. BECOMING EASILY ANNOYED OR IRRITABLE: NEARLY EVERY DAY
5. BEING SO RESTLESS THAT IT IS HARD TO SIT STILL: NEARLY EVERY DAY
GAD7 TOTAL SCORE: 21
2. NOT BEING ABLE TO STOP OR CONTROL WORRYING: NEARLY EVERY DAY
7. FEELING AFRAID AS IF SOMETHING AWFUL MIGHT HAPPEN: NEARLY EVERY DAY
1. FEELING NERVOUS, ANXIOUS, OR ON EDGE: NEARLY EVERY DAY

## 2021-12-15 ASSESSMENT — COLUMBIA-SUICIDE SEVERITY RATING SCALE - C-SSRS
1. WITHIN THE PAST MONTH, HAVE YOU WISHED YOU WERE DEAD OR WISHED YOU COULD GO TO SLEEP AND NOT WAKE UP?: YES
2. IN THE PAST MONTH, HAVE YOU ACTUALLY HAD ANY THOUGHTS OF KILLING YOURSELF?: NO
6. HAVE YOU EVER DONE ANYTHING, STARTED TO DO ANYTHING, OR PREPARED TO DO ANYTHING TO END YOUR LIFE?: NO

## 2021-12-15 ASSESSMENT — PATIENT HEALTH QUESTIONNAIRE - PHQ9
10. IF YOU CHECKED OFF ANY PROBLEMS, HOW DIFFICULT HAVE THESE PROBLEMS MADE IT FOR YOU TO DO YOUR WORK, TAKE CARE OF THINGS AT HOME, OR GET ALONG WITH OTHER PEOPLE: VERY DIFFICULT
SUM OF ALL RESPONSES TO PHQ QUESTIONS 1-9: 20
SUM OF ALL RESPONSES TO PHQ QUESTIONS 1-9: 20
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
## 2021-12-16 ASSESSMENT — ANXIETY QUESTIONNAIRES: GAD7 TOTAL SCORE: 21

## 2021-12-16 ASSESSMENT — PATIENT HEALTH QUESTIONNAIRE - PHQ9: SUM OF ALL RESPONSES TO PHQ QUESTIONS 1-9: 20

## 2021-12-17 NOTE — RESULT ENCOUNTER NOTE
Kameron Womack,  Your TSH (thyroid function test) is normal.  Your comprehensive metabolic panel shows normal liver function, kidney function, blood sugar, and blood salts but the low protein level can be a marker of poor nutrition.  I'm glad you met with the nutritionist; please work on eating healthy foods and making sure you are getting good sources of protein in your diet.      Maura Sun MD

## 2022-01-16 NOTE — PATIENT INSTRUCTIONS
I will have Nurse Marlyn reach out to you to schedule an appointment to swab your cheek for GeneSight testing.  Please bring your insurance card to that appointment.    I kindly request that you check your MyChart prior to all appointments with me and complete any assigned questionnaires ahead of time.

## 2022-01-16 NOTE — PROGRESS NOTES
Shanta is a 31 year old who is being evaluated via a billable video visit.      How would you like to obtain your AVS? MyChart  If the video visit is dropped, the invitation should be resent by: Send to e-mail at: [email or web address]  Will anyone else be joining your video visit? No    Video Start Time: 12:07 PM    Assessment & Plan     Major depressive disorder with single episode, in partial remission (H)  BERTHA (generalized anxiety disorder)  She had a very negative experience with sertraline (triggering severe anxiety) and is very reluctant to try an SSRI medication again.  She has had a good emotional response to very low dose buspar but is having significant dizzy episodes that recur shortly after each dose.  She's also tried hydroxyzine for her anxiety but found it overly sedating.  I discussed option of GeneSight testing.  This may help guide us in choosing an SSRI or SNRI medication to try.  I discussed limitations of GeneSight testing - that it does not predict which medication will work the best at controlling symptoms but gives us insight into which drugs would be metabolically optimal.  She is agreeable to testing and will schedule a nurse-only appointment for that.    - busPIRone (BUSPAR) 5 MG tablet  Dispense: 60 tablet; Refill: 3  - GeneSight Psychotropic  - GeneSight MTHFR      Unintentional weight loss  Weight loss is most likely psychological in etiology and she reports some weight gain.  We'll get updated weight when she comes in to clinic.       Patient Instructions   I will have Nurse Marlyn reach out to you to schedule an appointment to swab your cheek for GeneSight testing.  Please bring your insurance card to that appointment.    I kindly request that you check your MyChart prior to all appointments with me and complete any assigned questionnaires ahead of time.        No follow-ups on file.    Maura Sun MD  [facility]        Subjective    Shanta is a 31 year old who presents for the following health issues     History of Present Illness       Mental Health Follow-up:  Patient presents to follow-up on Depression & Anxiety.Patient's depression since last visit has been:  Better  The patient is having other symptoms associated with depression.  Patient's anxiety since last visit has been:  Better  The patient is having other symptoms associated with anxiety.  Any significant life events: No  Patient is not feeling anxious or having panic attacks.  Patient has no concerns about alcohol or drug use.     Social History  Tobacco Use    Smoking status: Current Some Day Smoker      Packs/day: 0.00    Smokeless tobacco: Never Used    Tobacco comment: 1 cig a month  Vaping Use    Vaping Use: Some days      Substances: THC, CBD      Devices: Disposable  Alcohol use: Yes    Alcohol/week: 0.0 standard drinks    Comment: once a year   Drug use: No      Today's PHQ-9         PHQ-9 Total Score:     (P) 8   PHQ-9 Q9 Thoughts of better off dead/self-harm past 2 weeks :   (P) Not at all   Thoughts of suicide or self harm:      Self-harm Plan:        Self-harm Action:          Safety concerns for self or others:             At our last visit we discussed medication options and she declined SSRI medication due to the side effects she had previously with sertraline. She did agree to start Buspar and I prescribed her a low dose (5 mg BID).  She finds it helpful but feels dizzy an hour or so after taking it.  The dizziness is quite bothersome and she lies down to let it pass.  She's been leaving the house more frequently but then skips the morning dose to avoid feeling dizzy when she's out.  Most days she just takes the evening dose.       Social History     Tobacco Use     Smoking status: Current Some Day Smoker     Packs/day: 0.00     Smokeless tobacco: Never Used     Tobacco comment: 1 cig a month   Vaping Use     Vaping Use: Some days     Substances: THC, CBD      Devices: Disposable   Substance Use Topics     Alcohol use: Yes     Alcohol/week: 0.0 standard drinks     Comment: once a year      Drug use: No     PHQ 8/31/2021 12/15/2021 1/19/2022   PHQ-9 Total Score 12 20 8   Q9: Thoughts of better off dead/self-harm past 2 weeks Several days Several days Not at all   F/U: Thoughts of suicide or self-harm No No -   F/U: Safety concerns No No -     BERTHA-7 SCORE 8/31/2021 12/15/2021 1/19/2022   Total Score 20 (severe anxiety) 21 (severe anxiety) 12 (moderate anxiety)   Total Score 20 21 12     Last PHQ-9 1/19/2022   1.  Little interest or pleasure in doing things 1   2.  Feeling down, depressed, or hopeless 1   3.  Trouble falling or staying asleep, or sleeping too much 1   4.  Feeling tired or having little energy 1   5.  Poor appetite or overeating 1   6.  Feeling bad about yourself 1   7.  Trouble concentrating 1   8.  Moving slowly or restless 1   Q9: Thoughts of better off dead/self-harm past 2 weeks 0   PHQ-9 Total Score 8   In the past two weeks have you had thoughts of suicide or self harm? -   Do you have concerns about your personal safety or the safety of others? -     BERTHA-7  1/19/2022   1. Feeling nervous, anxious, or on edge 1   2. Not being able to stop or control worrying 1   3. Worrying too much about different things 1   4. Trouble relaxing 1   5. Being so restless that it is hard to sit still 3   6. Becoming easily annoyed or irritable 2   7. Feeling afraid, as if something awful might happen 3   BERTHA-7 Total Score 12     Answers for HPI/ROS submitted by the patient on 1/19/2022  If you checked off any problems, how difficult have these problems made it for you to do your work, take care of things at home, or get along with other people?: Somewhat difficult  PHQ9 TOTAL SCORE: 8  BERTHA 7 TOTAL SCORE: 12      Unintentional Weight Loss Follow-Up  She feels she has gained some weight in the past few weeks.   Wt Readings from Last 5 Encounters:   11/13/21 44 kg (97 lb)    08/31/21 43.6 kg (96 lb 1.9 oz)   12/30/19 55.8 kg (123 lb)   10/21/19 54.4 kg (120 lb)   10/02/19 54.5 kg (120 lb 4 oz)          Review of Systems         Objective       Vitals:  No vitals were obtained today due to virtual visit.    Physical Exam   GENERAL: Healthy, alert and no distress  EYES: Eyes grossly normal to inspection.  No discharge or erythema, or obvious scleral/conjunctival abnormalities.  RESP: No audible wheeze, cough, or visible cyanosis.  No visible retractions or increased work of breathing.    SKIN: Visible skin clear. No significant rash, abnormal pigmentation or lesions.  NEURO: Cranial nerves grossly intact.  Mentation and speech appropriate for age.  PSYCH: Mentation appears normal, affect normal/bright, judgement and insight intact, normal speech and appearance well-groomed.        Video-Visit Details    Type of service:  Video Visit    Video End Time:12:19 PM    Originating Location (pt. Location): Home    Distant Location (provider location):  [facility]     Platform used for Video Visit: NanoICE

## 2022-01-19 ENCOUNTER — VIRTUAL VISIT (OUTPATIENT)
Dept: FAMILY MEDICINE | Facility: CLINIC | Age: 32
End: 2022-01-19
Payer: COMMERCIAL

## 2022-01-19 ENCOUNTER — TELEPHONE (OUTPATIENT)
Dept: FAMILY MEDICINE | Facility: CLINIC | Age: 32
End: 2022-01-19

## 2022-01-19 DIAGNOSIS — R63.4 UNINTENTIONAL WEIGHT LOSS: Primary | ICD-10-CM

## 2022-01-19 DIAGNOSIS — F32.4 MAJOR DEPRESSIVE DISORDER WITH SINGLE EPISODE, IN PARTIAL REMISSION (H): ICD-10-CM

## 2022-01-19 DIAGNOSIS — Z72.0 TOBACCO ABUSE: ICD-10-CM

## 2022-01-19 DIAGNOSIS — F41.1 GAD (GENERALIZED ANXIETY DISORDER): ICD-10-CM

## 2022-01-19 PROCEDURE — 99214 OFFICE O/P EST MOD 30 MIN: CPT | Mod: 95 | Performed by: FAMILY MEDICINE

## 2022-01-19 RX ORDER — BUSPIRONE HYDROCHLORIDE 5 MG/1
5 TABLET ORAL 2 TIMES DAILY
Qty: 60 TABLET | Refills: 3 | Status: SHIPPED | OUTPATIENT
Start: 2022-01-19 | End: 2023-02-08

## 2022-01-19 ASSESSMENT — PATIENT HEALTH QUESTIONNAIRE - PHQ9
SUM OF ALL RESPONSES TO PHQ QUESTIONS 1-9: 8
SUM OF ALL RESPONSES TO PHQ QUESTIONS 1-9: 8
10. IF YOU CHECKED OFF ANY PROBLEMS, HOW DIFFICULT HAVE THESE PROBLEMS MADE IT FOR YOU TO DO YOUR WORK, TAKE CARE OF THINGS AT HOME, OR GET ALONG WITH OTHER PEOPLE: SOMEWHAT DIFFICULT

## 2022-01-19 ASSESSMENT — ANXIETY QUESTIONNAIRES
6. BECOMING EASILY ANNOYED OR IRRITABLE: MORE THAN HALF THE DAYS
GAD7 TOTAL SCORE: 12
7. FEELING AFRAID AS IF SOMETHING AWFUL MIGHT HAPPEN: NEARLY EVERY DAY
1. FEELING NERVOUS, ANXIOUS, OR ON EDGE: SEVERAL DAYS
3. WORRYING TOO MUCH ABOUT DIFFERENT THINGS: SEVERAL DAYS
4. TROUBLE RELAXING: SEVERAL DAYS
GAD7 TOTAL SCORE: 12
2. NOT BEING ABLE TO STOP OR CONTROL WORRYING: SEVERAL DAYS
GAD7 TOTAL SCORE: 12
7. FEELING AFRAID AS IF SOMETHING AWFUL MIGHT HAPPEN: NEARLY EVERY DAY
5. BEING SO RESTLESS THAT IT IS HARD TO SIT STILL: NEARLY EVERY DAY

## 2022-01-19 NOTE — TELEPHONE ENCOUNTER
Dr. Sun asked writer to contact patient to schedule RN triage visit for Genesight testing.  Patient needs to bring insurance card and will need an updated weight as well.    Left message to call back and ask to speak with Marlyn in triage.    OZZY Stroud, RN  [facility]

## 2022-01-19 NOTE — TELEPHONE ENCOUNTER
Patient returned call. Scheduled RN visit for tomorrow.    Chrystal Lucas RN  [facility]

## 2022-01-20 ENCOUNTER — OFFICE VISIT (OUTPATIENT)
Dept: NURSING | Facility: CLINIC | Age: 32
End: 2022-01-20
Payer: COMMERCIAL

## 2022-01-20 VITALS — BODY MASS INDEX: 18.52 KG/M2 | WEIGHT: 98 LBS

## 2022-01-20 DIAGNOSIS — R63.4 UNINTENTIONAL WEIGHT LOSS: ICD-10-CM

## 2022-01-20 DIAGNOSIS — F32.2 CURRENT SEVERE EPISODE OF MAJOR DEPRESSIVE DISORDER WITHOUT PSYCHOTIC FEATURES WITHOUT PRIOR EPISODE (H): ICD-10-CM

## 2022-01-20 DIAGNOSIS — F41.1 GAD (GENERALIZED ANXIETY DISORDER): Primary | ICD-10-CM

## 2022-01-20 PROCEDURE — 99207 PR NO CHARGE NURSE ONLY: CPT

## 2022-01-20 ASSESSMENT — PATIENT HEALTH QUESTIONNAIRE - PHQ9: SUM OF ALL RESPONSES TO PHQ QUESTIONS 1-9: 8

## 2022-01-20 ASSESSMENT — ANXIETY QUESTIONNAIRES: GAD7 TOTAL SCORE: 12

## 2022-01-20 NOTE — NURSING NOTE
Patient presents to clinic for completion of Genesight testing.    [employer] information form handed to patient for review.  Patient completed consent form and yellow copy of consent form handed to patient.    Patient completed buccal swabs at the direction of writer.    Copy of front and back of insurance card made and insurance card handed back to patient.    Dr. Sun signed swab envelop.  Testing kit placed at  for [employer] .  Confirmation: [id number].    AUBRIE StroudN, RN  [facility]

## 2022-03-28 ENCOUNTER — TELEPHONE (OUTPATIENT)
Dept: FAMILY MEDICINE | Facility: CLINIC | Age: 32
End: 2022-03-28
Payer: COMMERCIAL

## 2022-03-28 DIAGNOSIS — Z15.89 GENETIC SUSCEPTIBILITY TO OTHER DISEASE: ICD-10-CM

## 2022-03-28 DIAGNOSIS — Z15.89: ICD-10-CM

## 2022-03-28 NOTE — TELEPHONE ENCOUNTER
Patient never followed up with me after her GeneSight testing in January.  It is important that I discuss her results with her and she has some very significant gene-drug interactions that she needs to be made aware of.  Please have her schedule a video visit with me.      Maura Sun MD

## 2022-04-13 ENCOUNTER — VIRTUAL VISIT (OUTPATIENT)
Dept: FAMILY MEDICINE | Facility: CLINIC | Age: 32
End: 2022-04-13
Payer: COMMERCIAL

## 2022-04-13 DIAGNOSIS — F32.4 MAJOR DEPRESSIVE DISORDER WITH SINGLE EPISODE, IN PARTIAL REMISSION (H): ICD-10-CM

## 2022-04-13 DIAGNOSIS — Z15.89: Primary | ICD-10-CM

## 2022-04-13 DIAGNOSIS — F41.1 GAD (GENERALIZED ANXIETY DISORDER): ICD-10-CM

## 2022-04-13 PROCEDURE — 99213 OFFICE O/P EST LOW 20 MIN: CPT | Mod: 95 | Performed by: FAMILY MEDICINE

## 2022-04-13 ASSESSMENT — ANXIETY QUESTIONNAIRES
5. BEING SO RESTLESS THAT IT IS HARD TO SIT STILL: MORE THAN HALF THE DAYS
3. WORRYING TOO MUCH ABOUT DIFFERENT THINGS: NEARLY EVERY DAY
7. FEELING AFRAID AS IF SOMETHING AWFUL MIGHT HAPPEN: NEARLY EVERY DAY
IF YOU CHECKED OFF ANY PROBLEMS ON THIS QUESTIONNAIRE, HOW DIFFICULT HAVE THESE PROBLEMS MADE IT FOR YOU TO DO YOUR WORK, TAKE CARE OF THINGS AT HOME, OR GET ALONG WITH OTHER PEOPLE: SOMEWHAT DIFFICULT
GAD7 TOTAL SCORE: 17
2. NOT BEING ABLE TO STOP OR CONTROL WORRYING: NEARLY EVERY DAY
6. BECOMING EASILY ANNOYED OR IRRITABLE: SEVERAL DAYS
1. FEELING NERVOUS, ANXIOUS, OR ON EDGE: NEARLY EVERY DAY

## 2022-04-13 ASSESSMENT — PATIENT HEALTH QUESTIONNAIRE - PHQ9
SUM OF ALL RESPONSES TO PHQ QUESTIONS 1-9: 10
5. POOR APPETITE OR OVEREATING: MORE THAN HALF THE DAYS

## 2022-04-13 NOTE — PATIENT INSTRUCTIONS
I kindly request that you check your MyChart prior to all appointments with me and complete any assigned questionnaires ahead of time.      I do ask that all patients who are taking chronic medications for conditions that I am managing schedule an in-person visit with me at least once a year.       Depression Self-Care Plan / Wellness Kit    Many people find that medication and therapy are helpful treatments for managing depression. In addition, making small changes to your everyday life can help to boost your mood and improve your wellbeing. Below are some tips for you to consider. Be sure to talk with your medical provider and/or behavioral health consultant if your symptoms are worsening or not improving.     Sleep   Sleep hygiene  means all of the habits that support good, restful sleep. It includes maintaining a consistent bedtime and wake time, using your bedroom only for sleeping or sex, and keeping the bedroom dark and free of distractions like a computer, smartphone, or television.     Develop a Healthy Routine  Maintain good hygiene. Get out of bed in the morning, make your bed, brush your teeth, take a shower, and get dressed. Don t spend too much time viewing media that makes you feel stressed. Find time to relax each day.    Exercise  Get some form of exercise every day. This will help reduce pain and release endorphins, the  feel good  chemicals in your brain. It can be as simple as just going for a walk or doing some gardening, anything that will get you moving.      Diet  Strive to eat healthy foods, including fruits and vegetables. Drink plenty of water. Avoid excessive sugar, caffeine, alcohol, and other mood-altering substances.     Stay Connected with Others  Stay in touch with friends and family members.  Find ways to help others and be of service to others.      Manage Your Mood  Try deep breathing, massage therapy, biofeedback, or meditation. Take part in fun activities when you can. Try to  find something to smile about each day.     Psychotherapy  Be open to working with a therapist if your provider recommends it.     Medication  Be sure to take your medication as prescribed. Most anti-depressants need to be taken every day. It usually takes several weeks for medications to work. Not all medicines work for all people. It is important to follow-up with your provider to make sure you have a treatment plan that is working for you. Do not stop your medication abruptly without first discussing it with your provider.    Crisis Resources   These hotlines are for both adults and children. They and are open 24 hours a day, 7 days a week unless noted otherwise.    National Suicide Prevention Lifeline   9-539-148-TALK (0805)    Crisis Text Line    www.crisistextline.org  Text HOME to 905875 from anywhere in the [place], anytime, about any type of crisis. A live, trained crisis counselor will receive the text and respond quickly.    [place] Lifeline for LGBTQ Youth  A national crisis intervention and suicide lifeline for LGBTQ youth under 25. Provides a safe place to talk without judgement. Call 1-922.427.7911; text START to 721453 or visit www.the[employer]project.org to talk to a trained counselor.    For [facility] crisis numbers, visit the [facility] website at:  https://[place].gov/dhs/people-we-serve/adults/health-care/mental-health/resources/crisis-contacts.jsp

## 2022-04-13 NOTE — PROGRESS NOTES
Shanta is a 31 year old who is being evaluated via a billable video visit.      How would you like to obtain your AVS? MyChart  If the video visit is dropped, the invitation should be resent by: Text to cell phone: [phone]  Will anyone else be joining your video visit? 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  I reviewed that with this allele she is at high risk for Soto Sammy Syndrome or Toxic Epidermal Necrolysis if she is ever prescribed:    Lamictal (lamotrigine)    Tegretol (carbamazepine)    Trileptal (oxcarbazapine)  Discussed that SJS and TEN are very serious medical reactions, often requiring hospitalization, and that she should never take these medications.  Discussed that they are used to treat seizures and Bipolar Disorder (Manic Depression).        Major depressive disorder with single episode, in partial remission (H)  BERTHA (generalized anxiety disorder)  At this time she is declining further medication trials for mood/anxiety.  Indeed her mood and anxiety have improved.  Discussed non-medication approaches including being in nature (walking her dog), maintaining social connections, and finding ways to help or be of service to others.       See Patient Instructions    No follow-ups on file.    Maura Sun MD  [facility]        Subjective   Shanta is a 31 year old who presents for the following health issues     HPI       Patient would like to follow up on Gene Sight results    Depression and Anxiety Follow-Up    How are you doing with your depression since your last visit? Improved     How are you doing with your anxiety since your last visit?  Improved     Are you having other symptoms that might be associated with depression or anxiety? No    Have you had a significant life event? No     Do you have any  concerns with your use of alcohol or other drugs? No   She is not taking an SSRI medication but has been working on self-care and recently got a new puppy.  She did [employer] in January and based on that I had suggested next med trial could be Pristiq.    Social History     Tobacco Use     Smoking status: Former Smoker     Packs/day: 0.00     Types: Cigarettes     Smokeless tobacco: Never Used     Tobacco comment: 1 cig a month   Vaping Use     Vaping Use: Some days     Substances: THC, CBD     Devices: Disposable   Substance Use Topics     Alcohol use: Yes     Alcohol/week: 0.0 standard drinks     Comment: once a year      Drug use: No     PHQ 12/15/2021 1/19/2022 4/13/2022   PHQ-9 Total Score 20 8 10   Q9: Thoughts of better off dead/self-harm past 2 weeks Several days Not at all Not at all   F/U: Thoughts of suicide or self-harm No - -   F/U: Safety concerns No - -     BERTHA-7 SCORE 12/15/2021 1/19/2022 4/13/2022   Total Score 21 (severe anxiety) 12 (moderate anxiety) -   Total Score 21 12 17     Last PHQ-9 4/13/2022   1.  Little interest or pleasure in doing things 2   2.  Feeling down, depressed, or hopeless 2   3.  Trouble falling or staying asleep, or sleeping too much 1   4.  Feeling tired or having little energy 1   5.  Poor appetite or overeating 1   6.  Feeling bad about yourself 2   7.  Trouble concentrating 1   8.  Moving slowly or restless 0   Q9: Thoughts of better off dead/self-harm past 2 weeks 0   PHQ-9 Total Score 10   Difficulty at work, home, or with people Somewhat difficult   In the past two weeks have you had thoughts of suicide or self harm? -   Do you have concerns about your personal safety or the safety of others? -     BERTHA-7  4/13/2022   1. Feeling nervous, anxious, or on edge 3   2. Not being able to stop or control worrying 3   3. Worrying too much about different things 3   4. Trouble relaxing 2   5. Being so restless that it is hard to sit still 2   6. Becoming easily annoyed or  "irritable 1   7. Feeling afraid, as if something awful might happen 3   BERTHA-7 Total Score 17   If you checked any problems, how difficult have they made it for you to do your work, take care of things at home, or get along with other people? Somewhat difficult       Suicide Assessment Five-step Evaluation and Treatment (SAFE-T)      How many servings of fruits and vegetables do you eat daily?  0-1    On average, how many sweetened beverages do you drink each day (Examples: soda, juice, sweet tea, etc.  Do NOT count diet or artificially sweetened beverages)?   0    How many days per week do you exercise enough to make your heart beat faster? 3 or less    How many minutes a day do you exercise enough to make your heart beat faster? 30 - 60  How many days per week do you miss taking your medication? 4    What makes it hard for you to take your medications?  remembering to take      Review of Systems         Objective    Vitals - Patient Reported  Height (Patient Reported): 154.9 cm (5' 1\")        Physical Exam   GENERAL: Healthy, alert and no distress  EYES: Eyes grossly normal to inspection.  No discharge or erythema, or obvious scleral/conjunctival abnormalities.  RESP: No audible wheeze, cough, or visible cyanosis.  No visible retractions or increased work of breathing.    SKIN: Visible skin clear. No significant rash, abnormal pigmentation or lesions.  NEURO: Cranial nerves grossly intact.  Mentation and speech appropriate for age.  PSYCH: Mentation appears normal, affect normal/bright, judgement and insight intact, normal speech and appearance well-groomed.        Video-Visit Details    Type of service:  Video Visit    Video End Time:11:49 AM    Originating Location (pt. Location): Home    Distant Location (provider location):  [facility]     Platform used for Video Visit: Ciro  "

## 2022-04-14 ASSESSMENT — ANXIETY QUESTIONNAIRES: GAD7 TOTAL SCORE: 17

## 2022-10-29 ENCOUNTER — HEALTH MAINTENANCE LETTER (OUTPATIENT)
Age: 32
End: 2022-10-29

## 2023-02-08 ENCOUNTER — OFFICE VISIT (OUTPATIENT)
Dept: FAMILY MEDICINE | Facility: CLINIC | Age: 33
End: 2023-02-08
Payer: COMMERCIAL

## 2023-02-08 VITALS
WEIGHT: 97.4 LBS | HEART RATE: 94 BPM | SYSTOLIC BLOOD PRESSURE: 97 MMHG | RESPIRATION RATE: 16 BRPM | DIASTOLIC BLOOD PRESSURE: 65 MMHG | OXYGEN SATURATION: 98 % | TEMPERATURE: 98.4 F | BODY MASS INDEX: 17.92 KG/M2 | HEIGHT: 62 IN

## 2023-02-08 DIAGNOSIS — Z13.0 SCREENING FOR ENDOCRINE, METABOLIC AND IMMUNITY DISORDER: ICD-10-CM

## 2023-02-08 DIAGNOSIS — F41.1 GAD (GENERALIZED ANXIETY DISORDER): ICD-10-CM

## 2023-02-08 DIAGNOSIS — Z13.0 SCREENING FOR DEFICIENCY ANEMIA: ICD-10-CM

## 2023-02-08 DIAGNOSIS — Z12.4 SCREENING FOR MALIGNANT NEOPLASM OF CERVIX: ICD-10-CM

## 2023-02-08 DIAGNOSIS — Z13.228 SCREENING FOR ENDOCRINE, METABOLIC AND IMMUNITY DISORDER: ICD-10-CM

## 2023-02-08 DIAGNOSIS — Z12.4 CERVICAL CANCER SCREENING: ICD-10-CM

## 2023-02-08 DIAGNOSIS — Z13.29 SCREENING FOR ENDOCRINE, METABOLIC AND IMMUNITY DISORDER: ICD-10-CM

## 2023-02-08 DIAGNOSIS — Z00.00 ROUTINE ADULT HEALTH MAINTENANCE: Primary | ICD-10-CM

## 2023-02-08 PROBLEM — F32.2 CURRENT SEVERE EPISODE OF MAJOR DEPRESSIVE DISORDER WITHOUT PSYCHOTIC FEATURES WITHOUT PRIOR EPISODE (H): Status: RESOLVED | Noted: 2021-12-15 | Resolved: 2023-02-08

## 2023-02-08 PROCEDURE — 99395 PREV VISIT EST AGE 18-39: CPT | Performed by: PHYSICIAN ASSISTANT

## 2023-02-08 PROCEDURE — 87624 HPV HI-RISK TYP POOLED RSLT: CPT | Performed by: PHYSICIAN ASSISTANT

## 2023-02-08 PROCEDURE — G0145 SCR C/V CYTO,THINLAYER,RESCR: HCPCS | Performed by: PHYSICIAN ASSISTANT

## 2023-02-08 ASSESSMENT — ENCOUNTER SYMPTOMS
CONSTIPATION: 0
EYE PAIN: 0
COUGH: 0
WEAKNESS: 0
ABDOMINAL PAIN: 0
NERVOUS/ANXIOUS: 0
JOINT SWELLING: 0
CHILLS: 0
ARTHRALGIAS: 0
FREQUENCY: 0
DIZZINESS: 0
MYALGIAS: 0
PARESTHESIAS: 0
HEMATOCHEZIA: 0
HEMATURIA: 0
HEARTBURN: 0
FEVER: 0
PALPITATIONS: 0
SHORTNESS OF BREATH: 0
BREAST MASS: 0
NAUSEA: 0
SORE THROAT: 0
DYSURIA: 0
HEADACHES: 0
DIARRHEA: 0

## 2023-02-08 ASSESSMENT — ANXIETY QUESTIONNAIRES
3. WORRYING TOO MUCH ABOUT DIFFERENT THINGS: SEVERAL DAYS
GAD7 TOTAL SCORE: 7
4. TROUBLE RELAXING: SEVERAL DAYS
IF YOU CHECKED OFF ANY PROBLEMS ON THIS QUESTIONNAIRE, HOW DIFFICULT HAVE THESE PROBLEMS MADE IT FOR YOU TO DO YOUR WORK, TAKE CARE OF THINGS AT HOME, OR GET ALONG WITH OTHER PEOPLE: SOMEWHAT DIFFICULT
1. FEELING NERVOUS, ANXIOUS, OR ON EDGE: SEVERAL DAYS
5. BEING SO RESTLESS THAT IT IS HARD TO SIT STILL: SEVERAL DAYS
7. FEELING AFRAID AS IF SOMETHING AWFUL MIGHT HAPPEN: SEVERAL DAYS
GAD7 TOTAL SCORE: 7
8. IF YOU CHECKED OFF ANY PROBLEMS, HOW DIFFICULT HAVE THESE MADE IT FOR YOU TO DO YOUR WORK, TAKE CARE OF THINGS AT HOME, OR GET ALONG WITH OTHER PEOPLE?: SOMEWHAT DIFFICULT
7. FEELING AFRAID AS IF SOMETHING AWFUL MIGHT HAPPEN: SEVERAL DAYS
GAD7 TOTAL SCORE: 7
2. NOT BEING ABLE TO STOP OR CONTROL WORRYING: SEVERAL DAYS
6. BECOMING EASILY ANNOYED OR IRRITABLE: SEVERAL DAYS

## 2023-02-08 ASSESSMENT — PATIENT HEALTH QUESTIONNAIRE - PHQ9
SUM OF ALL RESPONSES TO PHQ QUESTIONS 1-9: 6
10. IF YOU CHECKED OFF ANY PROBLEMS, HOW DIFFICULT HAVE THESE PROBLEMS MADE IT FOR YOU TO DO YOUR WORK, TAKE CARE OF THINGS AT HOME, OR GET ALONG WITH OTHER PEOPLE: SOMEWHAT DIFFICULT
SUM OF ALL RESPONSES TO PHQ QUESTIONS 1-9: 6

## 2023-02-08 NOTE — PROGRESS NOTES
SUBJECTIVE:   CC: Shanta is an 32 year old who presents for preventive health visit.   Patient has been advised of split billing requirements and indicates understanding: Yes  Healthy Habits:     Getting at least 3 servings of Calcium per day:  Yes    Bi-annual eye exam:  NO    Dental care twice a year:  Yes    Sleep apnea or symptoms of sleep apnea:  None    Diet:  Regular (no restrictions)    Frequency of exercise:  1 day/week    Duration of exercise:  30-45 minutes    Taking medications regularly:  Yes    Medication side effects:  Not applicable    PHQ-2 Total Score: 2    Additional concerns today:  Yes    Not taking buspirone - significant dizziness, nausea  Sertraline - worse anxiety, couldn't relax, felt over-stimulated  Hydroxyzine - only drowsiness  Mental health doing much better - sees therapist & nutrition    Today's PHQ-2 Score:   PHQ-2 ( 1999 [employer]) 2/8/2023   Q1: Little interest or pleasure in doing things 1   Q2: Feeling down, depressed or hopeless 1   PHQ-2 Score 2   PHQ-2 Total Score (12-17 Years)- Positive if 3 or more points; Administer PHQ-A if positive -   Q1: Little interest or pleasure in doing things Several days   Q2: Feeling down, depressed or hopeless Several days   PHQ-2 Score 2       Social History     Tobacco Use     Smoking status: Former     Packs/day: 0.00     Types: Cigarettes     Smokeless tobacco: Never     Tobacco comments:     1 cig a month   Substance Use Topics     Alcohol use: Yes     Alcohol/week: 0.0 standard drinks     Comment: once a year        Alcohol Use 2/8/2023   Prescreen: >3 drinks/day or >7 drinks/week? No   Prescreen: >3 drinks/day or >7 drinks/week? -     Reviewed orders with patient.  Reviewed health maintenance and updated orders accordingly - Yes    Breast Cancer Screening:    Breast CA Risk Assessment (FHS-7) 8/31/2021   Do you have a family history of breast, colon, or ovarian cancer? No / Unknown       Patient under 40 years of age: Routine  "Mammogram Screening not recommended.   Pertinent mammograms are reviewed under the imaging tab.    History of abnormal Pap smear: NO - age 30-65 PAP every 5 years with negative HPV co-testing recommended     Reviewed and updated as needed this visit by clinical staff   Tobacco  Allergies  Meds              Reviewed and updated as needed this visit by Provider                   Review of Systems   Constitutional: Negative for chills and fever.   HENT: Negative for congestion, ear pain, hearing loss and sore throat.    Eyes: Negative for pain and visual disturbance.   Respiratory: Negative for cough and shortness of breath.    Cardiovascular: Negative for chest pain, palpitations and peripheral edema.   Gastrointestinal: Negative for abdominal pain, constipation, diarrhea, heartburn, hematochezia and nausea.   Breasts:  Negative for tenderness, breast mass and discharge.   Genitourinary: Positive for vaginal discharge. Negative for dysuria, frequency, genital sores, hematuria, pelvic pain, urgency and vaginal bleeding.   Musculoskeletal: Negative for arthralgias, joint swelling and myalgias.   Skin: Negative for rash.   Neurological: Negative for dizziness, weakness, headaches and paresthesias.   Psychiatric/Behavioral: Negative for mood changes. The patient is not nervous/anxious.         OBJECTIVE:   BP 97/65 (BP Location: Right arm, Patient Position: Sitting, Cuff Size: Adult Regular)   Pulse 94   Temp 98.4  F (36.9  C)   Resp 16   Ht 1.575 m (5' 2\")   Wt 44.2 kg (97 lb 6.4 oz)   LMP 01/25/2023 (Exact Date)   SpO2 98%   BMI 17.81 kg/m    Physical Exam  GENERAL: healthy, alert and no distress  EYES: Eyes grossly normal to inspection, PERRL and conjunctivae and sclerae normal  HENT: ear canals and TM's normal, nose and mouth without ulcers or lesions  NECK: no adenopathy, no asymmetry, masses, or scars and thyroid normal to palpation  RESP: lungs clear to auscultation - no rales, rhonchi or " wheezes  BREAST: normal without masses, tenderness or nipple discharge and no palpable axillary masses or adenopathy  CV: regular rate and rhythm, normal S1 S2, no S3 or S4, no murmur, click or rub, no peripheral edema and peripheral pulses strong  ABDOMEN: soft, nontender, no hepatosplenomegaly, no masses and bowel sounds normal   (female): External genitalia normal, urethra normal, vagina normal, cervix normal without lesions. Uterus, ovaries, bladder, adnexa normal per bimanual exam. Pap collected.  MS: no gross musculoskeletal defects noted, no edema  SKIN: no suspicious lesions or rashes  NEURO: Normal strength and tone, mentation intact and speech normal  PSYCH: mentation appears normal, affect normal/bright      ASSESSMENT/PLAN:   Shanta was seen today for physical.    Diagnoses and all orders for this visit:    Routine adult health maintenance  -     REVIEW OF HEALTH MAINTENANCE PROTOCOL ORDERS    BERTHA (generalized anxiety disorder) - stable with therapy, no change to treatment plan.    Screening for deficiency anemia    Screening for endocrine, metabolic and immunity disorder    Screening for malignant neoplasm of cervix  -     Pap Screen with HPV - recommended age 30 - 65 years    Cervical cancer screening  -     Pap Screen with HPV - recommended age 30 - 65 years        COUNSELING:  Reviewed preventive health counseling, as reflected in patient instructions        She reports that she has quit smoking. Her smoking use included cigarettes. She has never used smokeless tobacco.          Magi Pablo PA-C  [facility]  Answers for HPI/ROS submitted by the patient on 2/8/2023  If you checked off any problems, how difficult have these problems made it for you to do your work, take care of things at home, or get along with other people?: Somewhat difficult  PHQ9 TOTAL SCORE: 6  BERTHA 7 TOTAL SCORE: 7

## 2023-02-13 LAB
BKR LAB AP GYN ADEQUACY: NORMAL
BKR LAB AP GYN INTERPRETATION: NORMAL
BKR LAB AP HPV REFLEX: NORMAL
BKR LAB AP LMP: NORMAL
BKR LAB AP PREVIOUS ABNORMAL: NORMAL
PATH REPORT.COMMENTS IMP SPEC: NORMAL
PATH REPORT.COMMENTS IMP SPEC: NORMAL
PATH REPORT.RELEVANT HX SPEC: NORMAL

## 2023-02-16 ENCOUNTER — PATIENT OUTREACH (OUTPATIENT)
Dept: FAMILY MEDICINE | Facility: CLINIC | Age: 33
End: 2023-02-16
Payer: COMMERCIAL

## 2023-02-16 LAB
HUMAN PAPILLOMA VIRUS 16 DNA: NEGATIVE
HUMAN PAPILLOMA VIRUS 18 DNA: NEGATIVE
HUMAN PAPILLOMA VIRUS FINAL DIAGNOSIS: ABNORMAL
HUMAN PAPILLOMA VIRUS OTHER HR: POSITIVE

## 2023-02-23 NOTE — PROGRESS NOTES
"Video-Visit Details     Type of service:  Video Visit     Video Start Time (time video started): 7:51am     Video End Time (time video stopped): 8:31am    Originating Location (pt. Location): Home     Distant Location (provider location):  [facility] [place] [facility]      Mode of Communication:  Video Conference via [facility] - ASSESSMENT NOTE    Shanta Edmondson 31 year old referred for MNT related to loss of appetite, weight loss    Time Spent: 40 minutes (3 units)  Visit Type: video  Pt accompanied by: self  Referring Physician: MD Dino     NUTRITION HISTORY  Factors affecting nutrition intake include:decreased appetite  Current diet/appetite: general Womack's weight loss started around March of 2021.  She has lost over 20 lbs. Mostly related to stress, depression and anxiety.   Her eating habits tends to be disordered as she's not often hungry. She tends to mostly restrict and skip meals.   She doesn't eat until around noon.  Even then, she only has small portions of fruit, grapes, cookies etc.   When she does eat a normal meal she gets nausea.    She eats mostly fast foods versus cooking meals at home and she doesn't snack much.    She doesn't take nutriton shakes or supplements other than biotin.    She has been starting to get out of the house more (works from home) as her mood has improved. Otherwise, she hasn't really left her home since March.  She has not been exercising either since her weight has been low.    She expresses her desire to improve her health, weight trends and inquires about exercise.     Diet Recall  Breakfast skips   Lunch Fruit, cookies, some    Dinner Eat out frequently Chic filet, nuggets OR pizza   Snacks No routine snacking crystal covered frozen fruit nuggest   Beverages Mostly water or sparkling,      ANTHROPOMETRICS  Height: 61\"  Weight: 97 lb/44kg  BMI: 18  Weight Status:  Underweight BMI <18.5  IBW: 105 " Recommendations reviewed with patient. Patient will increase Coreg to 9.375 mg BID (1.5 tablets). Instructed patient to monitor symptoms and blood pressure and call if his blood pressure is consistency bellow 100. Patient verbalized understanding.  Babs MARTELL RN  02/23/23 at 12:20 PM      (97%)  Weight History:   Wt Readings from Last 7 Encounters:   11/13/21 44 kg (97 lb)   08/31/21 43.6 kg (96 lb 1.9 oz)   12/30/19 55.8 kg (123 lb)   10/21/19 54.4 kg (120 lb)   10/02/19 54.5 kg (120 lb 4 oz)   09/07/19 54.4 kg (120 lb)   09/07/19 57.6 kg (127 lb)     Dosing Weight: 44kg    Medications/vitamins/minerals/herbals:   Reviewed - biotin    Labs:   Labs reviewed    NUTRITION FOCUSED PHYSICAL ASSESSMENT FOR DIAGNOSING MALNUTRITION:  Not observed due to Covid 19 pandemic - no clinic contact  Consult for education only      ASSESSED NUTRITION NEEDS:  Estimated Energy Needs: 1600 kcals (35 Kcal/Kg)  Justification: repletion  Estimated Protein Needs: 52 grams protein (1.2-1.5 g pro/Kg)  Justification: Repletion  Estimated Fluid Needs: 1500  mL   Justification: maintenance    MALNUTRITION:  % Weight Loss:  > 20% in 1 year (severe malnutrition)  % Intake:  <75% for >/= 3 months (moderate malnutrition)  Subcutaneous Fat Loss:  None observed  Muscle Loss:  None observed  Fluid Retention:  None noted    Malnutrition Diagnosis: Unable to determine but suspect moderate malnutrition    NUTRITION DIAGNOSIS:  Inadequate protein-energy intake related to decreased appetite with depression and anxiety as evidenced by 24% wt loss x past one year    INTERVENTIONS  Provided written & verbal education:     - Reviewed nutrition and hydration needs.   Advised pt to aim for at least 1600-1800kcal and 55-65g protein daily with desired exercise.   Advised pt to aim for 6-8 cups non-caffeine containing beverages (water/electrolytes) daily.    - Discussed strategies to help fortify meals and snacks. Reviewed sources of protein.   - Encouraged to have a protein source with each meal and snack.    - Encouraged to focus on small, frequent meals. .  - Encouraged to track daily intake to ensure goals are being reached.    - Reviewed oral nutrition supplement options (CIB with Fairlife milk etc).   - Encouraged utilizing these ONS in home  made shakes/smoothies to prevent flavor fatigue.  - Reviewed vitamins and minerals. Advised her to start taking a general MVI and Vit D3 daily (1000-2000IU). Message sent to MD for vitamin D lab draw.  Will further advise vitamin D supplement prn.       Provided pt with corresponding education materials/handouts on:  High Calorie/High Protein Recipe booklet, Tips to Increase the Calories in Your Diet and Sources of Protein    Pt verbalize understanding of materials provided during consult.   Patient Understanding: good  Expected patient engagement: good     Implementation  Collaboration and Referral of Nutrition care - Message sent to Dr. Sun for vitamin D lab draw.    Goals  1.  Aim for 5-6 small frequent meals  2.  Aim for 1600-1700kcal and 55-65g protein/day  3. Weight gain towards 105 lbs IBW    Follow-Up Plans: Pt has RD contact information for questions.      Sarahi Lucas, BONI, LD

## 2024-01-09 ENCOUNTER — PATIENT OUTREACH (OUTPATIENT)
Dept: CARE COORDINATION | Facility: CLINIC | Age: 34
End: 2024-01-09
Payer: COMMERCIAL

## 2024-01-19 ENCOUNTER — PATIENT OUTREACH (OUTPATIENT)
Dept: FAMILY MEDICINE | Facility: CLINIC | Age: 34
End: 2024-01-19
Payer: COMMERCIAL

## 2024-01-19 DIAGNOSIS — R87.810 CERVICAL HIGH RISK HPV (HUMAN PAPILLOMAVIRUS) TEST POSITIVE: Primary | ICD-10-CM

## 2024-01-23 ENCOUNTER — PATIENT OUTREACH (OUTPATIENT)
Dept: CARE COORDINATION | Facility: CLINIC | Age: 34
End: 2024-01-23
Payer: COMMERCIAL

## 2024-03-31 ENCOUNTER — HEALTH MAINTENANCE LETTER (OUTPATIENT)
Age: 34
End: 2024-03-31

## 2024-05-09 ENCOUNTER — OFFICE VISIT (OUTPATIENT)
Dept: FAMILY MEDICINE | Facility: CLINIC | Age: 34
End: 2024-05-09
Payer: COMMERCIAL

## 2024-05-09 VITALS
HEART RATE: 80 BPM | DIASTOLIC BLOOD PRESSURE: 70 MMHG | HEIGHT: 62 IN | BODY MASS INDEX: 18.99 KG/M2 | RESPIRATION RATE: 16 BRPM | OXYGEN SATURATION: 97 % | SYSTOLIC BLOOD PRESSURE: 98 MMHG | WEIGHT: 103.2 LBS | TEMPERATURE: 98 F

## 2024-05-09 DIAGNOSIS — R87.810 CERVICAL HIGH RISK HPV (HUMAN PAPILLOMAVIRUS) TEST POSITIVE: ICD-10-CM

## 2024-05-09 DIAGNOSIS — Z00.00 ROUTINE GENERAL MEDICAL EXAMINATION AT A HEALTH CARE FACILITY: Primary | ICD-10-CM

## 2024-05-09 DIAGNOSIS — Z11.3 SCREENING EXAMINATION FOR STI: ICD-10-CM

## 2024-05-09 DIAGNOSIS — Z30.09 GENERAL COUNSELING AND ADVICE ON FEMALE CONTRACEPTION: ICD-10-CM

## 2024-05-09 DIAGNOSIS — E55.9 VITAMIN D DEFICIENCY: ICD-10-CM

## 2024-05-09 DIAGNOSIS — F33.0 MILD RECURRENT MAJOR DEPRESSION (H): ICD-10-CM

## 2024-05-09 LAB — T PALLIDUM AB SER QL: NONREACTIVE

## 2024-05-09 PROCEDURE — 82306 VITAMIN D 25 HYDROXY: CPT | Performed by: FAMILY MEDICINE

## 2024-05-09 PROCEDURE — 36415 COLL VENOUS BLD VENIPUNCTURE: CPT | Performed by: FAMILY MEDICINE

## 2024-05-09 PROCEDURE — 87389 HIV-1 AG W/HIV-1&-2 AB AG IA: CPT | Performed by: FAMILY MEDICINE

## 2024-05-09 PROCEDURE — 87591 N.GONORRHOEAE DNA AMP PROB: CPT | Performed by: FAMILY MEDICINE

## 2024-05-09 PROCEDURE — 86803 HEPATITIS C AB TEST: CPT | Performed by: FAMILY MEDICINE

## 2024-05-09 PROCEDURE — 88175 CYTOPATH C/V AUTO FLUID REDO: CPT | Performed by: FAMILY MEDICINE

## 2024-05-09 PROCEDURE — 87491 CHLMYD TRACH DNA AMP PROBE: CPT | Performed by: FAMILY MEDICINE

## 2024-05-09 PROCEDURE — 99395 PREV VISIT EST AGE 18-39: CPT | Performed by: FAMILY MEDICINE

## 2024-05-09 PROCEDURE — 86780 TREPONEMA PALLIDUM: CPT | Performed by: FAMILY MEDICINE

## 2024-05-09 PROCEDURE — 87624 HPV HI-RISK TYP POOLED RSLT: CPT | Performed by: FAMILY MEDICINE

## 2024-05-09 PROCEDURE — 99214 OFFICE O/P EST MOD 30 MIN: CPT | Mod: 25 | Performed by: FAMILY MEDICINE

## 2024-05-09 RX ORDER — VENLAFAXINE HYDROCHLORIDE 75 MG/1
75 CAPSULE, EXTENDED RELEASE ORAL DAILY
Qty: 90 CAPSULE | Refills: 0 | Status: SHIPPED | OUTPATIENT
Start: 2024-05-09 | End: 2024-08-06

## 2024-05-09 RX ORDER — MAGNESIUM GLYCINATE 100 MG
200 CAPSULE ORAL DAILY
COMMUNITY

## 2024-05-09 RX ORDER — VENLAFAXINE HYDROCHLORIDE 37.5 MG/1
37.5 CAPSULE, EXTENDED RELEASE ORAL DAILY
Qty: 14 CAPSULE | Refills: 0 | Status: SHIPPED | OUTPATIENT
Start: 2024-05-09 | End: 2024-05-20

## 2024-05-09 SDOH — HEALTH STABILITY: PHYSICAL HEALTH: ON AVERAGE, HOW MANY DAYS PER WEEK DO YOU ENGAGE IN MODERATE TO STRENUOUS EXERCISE (LIKE A BRISK WALK)?: 6 DAYS

## 2024-05-09 ASSESSMENT — SOCIAL DETERMINANTS OF HEALTH (SDOH): HOW OFTEN DO YOU GET TOGETHER WITH FRIENDS OR RELATIVES?: TWICE A WEEK

## 2024-05-09 ASSESSMENT — PATIENT HEALTH QUESTIONNAIRE - PHQ9
SUM OF ALL RESPONSES TO PHQ QUESTIONS 1-9: 9
10. IF YOU CHECKED OFF ANY PROBLEMS, HOW DIFFICULT HAVE THESE PROBLEMS MADE IT FOR YOU TO DO YOUR WORK, TAKE CARE OF THINGS AT HOME, OR GET ALONG WITH OTHER PEOPLE: SOMEWHAT DIFFICULT
SUM OF ALL RESPONSES TO PHQ QUESTIONS 1-9: 9

## 2024-05-09 NOTE — PATIENT INSTRUCTIONS
"Start venlafaxine at 37.5 mg daily for a couple weeks.  Then increase to 75 mg daily.  If you want to hold at the 37.5 mg dose longer let me know.    If you have MyChart:  1) I kindly request that you check your MyChart prior to all appointments with me and complete any assigned questionnaires ahead of time.    2) You may receive auto-released results from our system before I have the opportunity to review and comment.  Be assured I will review and comment on all of your results as soon as I can.        FYI:  My schedule has been booking out very far in advance (2 months).  I apologize for the lack of timely access.  If you need to be seen for a chronic condition or preventive (wellness) visit, please be sure to schedule that appointment 2-3 months in advance.  If you have a concern that you feel cannot wait until my next available appointment (such as a hospital follow-up, pre-op, or new symptom of concern) please call clinic at [phone] and say \"my care team.\"  If you are still told that I have no availability please ask to speak to a [facility]  or Nurse who can often offer you an appointment with me within a week or so.  Or you can send me a [employer] message.  The trick is to avoid speaking with central scheduling.    I do ask that all patients who are taking chronic medications for conditions that I am managing schedule an in-person visit with me at least once a year.     Patient Education     Preventive Care Advice   This is general advice we often give to help people stay healthy. Your care team may have specific advice just for you. Please talk to your care team about your own preventive care needs.  Lifestyle  Exercise at least 150 minutes each week (30 minutes a day, 5 days a week).  Do muscle strengthening activities 2 days a week. These help control your weight and prevent disease.  No smoking.  Wear sunscreen to prevent skin cancer.  Have your home tested for radon every 2 to 5 " "years. Radon is a colorless, odorless gas that can harm your lungs. To learn more, go to www.health.[facility].mn. and search for \"Radon in Homes.\"  Keep guns unloaded and locked up in a safe place like a safe or gun vault, or, use a gun lock and hide the keys. Always lock away bullets separately. To learn more, visit [facility].mn.gov and search for \"safe gun storage.\"  Nutrition  Eat 5 or more servings of fruits and vegetables each day.  Try wheat bread, brown rice and whole grain pasta (instead of white bread, rice, and pasta).  Get enough calcium and vitamin D. Check the label on foods and aim for 100% of the RDA (recommended daily allowance).  Regular exams  Have a dental exam and cleaning every 6 months.  See your health care team every year to talk about:  Any changes in your health.  Any medicines your care team has prescribed.  Preventive care, family planning, and ways to prevent chronic diseases.  Shots (vaccines)   HPV shots (up to age 26), if you've never had them before.  Hepatitis B shots (up to age 59), if you've never had them before.  COVID-19 shot: Get this shot when it's due.  Flu shot: Get a flu shot every year.  Tetanus shot: Get a tetanus shot every 10 years.  Pneumococcal, hepatitis A, and RSV shots: Ask your care team if you need these based on your risk.  Shingles shot (for age 50 and up).  General health tests  Diabetes screening:  Starting at age 35, Get screened for diabetes at least every 3 years.  If you are younger than age 35, ask your care team if you should be screened for diabetes.  Cholesterol test: At age 39, start having a cholesterol test every 5 years, or more often if advised.  Bone density scan (DEXA): At age 50, ask your care team if you should have this scan for osteoporosis (brittle bones).  Hepatitis C: Get tested at least once in your life.  Abdominal aortic aneurysm screening: Talk to your doctor about having this screening if you:  Have ever smoked; and  Are biologically male; " and  Are between the ages of 65 and 75.  STIs (sexually transmitted infections)  Before age 24: Ask your care team if you should be screened for STIs.  After age 24: Get screened for STIs if you're at risk. You are at risk for STIs (including HIV) if:  You are sexually active with more than one person.  You don't use condoms every time.  You or a partner was diagnosed with a sexually transmitted infection.  If you are at risk for HIV, ask about PrEP medicine to prevent HIV.  Get tested for HIV at least once in your life, whether you are at risk for HIV or not.  Cancer screening tests  Cervical cancer screening: If you have a cervix, begin getting regular cervical cancer screening tests at age 21. Most people who have regular screenings with normal results can stop after age 65. Talk about this with your provider.  Breast cancer scan (mammogram): If you've ever had breasts, begin having regular mammograms starting at age 40. This is a scan to check for breast cancer.  Colon cancer screening: It is important to start screening for colon cancer at age 45.  Have a colonoscopy test every 10 years (or more often if you're at risk) Or, ask your provider about stool tests like a FIT test every year or Cologuard test every 3 years.  To learn more about your testing options, visit: www.myMedScore/080095.pdf.  For help making a decision, visit: [email or web address].  Prostate cancer screening test: If you have a prostate and are age 55 to 69, ask your provider if you would benefit from a yearly prostate cancer screening test.  Lung cancer screening: If you are a current or former smoker age 50 to 80, ask your care team if ongoing lung cancer screenings are right for you.  For informational purposes only. Not to replace the advice of your health care provider. Copyright   2023 [employer]. All rights reserved. Clinically reviewed by the [facility]. Recurve 121952 - REV 04/24.    Learning  About Stress  What is stress?     Stress is your body's response to a hard situation. Your body can have a physical, emotional, or mental response. Stress is a fact of life for most people, and it affects everyone differently. What causes stress for you may not be stressful for someone else.  A lot of things can cause stress. You may feel stress when you go on a job interview, take a test, or run a race. This kind of short-term stress is normal and even useful. It can help you if you need to work hard or react quickly. For example, stress can help you finish an important job on time.  Long-term stress is caused by ongoing stressful situations or events. Examples of long-term stress include long-term health problems, ongoing problems at work, or conflicts in your family. Long-term stress can harm your health.  How does stress affect your health?  When you are stressed, your body responds as though you are in danger. It makes hormones that speed up your heart, make you breathe faster, and give you a burst of energy. This is called the fight-or-flight stress response. If the stress is over quickly, your body goes back to normal and no harm is done.  But if stress happens too often or lasts too long, it can have bad effects. Long-term stress can make you more likely to get sick, and it can make symptoms of some diseases worse. If you tense up when you are stressed, you may develop neck, shoulder, or low back pain. Stress is linked to high blood pressure and heart disease.  Stress also harms your emotional health. It can make you muro, tense, or depressed. Your relationships may suffer, and you may not do well at work or school.  What can you do to manage stress?  You can try these things to help manage stress:   Do something active. Exercise or activity can help reduce stress. Walking is a great way to get started. Even everyday activities such as housecleaning or yard work can help.  Try yoga or portia chi. These  techniques combine exercise and meditation. You may need some training at first to learn them.  Do something you enjoy. For example, listen to music or go to a movie. Practice your hobby or do volunteer work.  Meditate. This can help you relax, because you are not worrying about what happened before or what may happen in the future.  Do guided imagery. Imagine yourself in any setting that helps you feel calm. You can use online videos, books, or a teacher to guide you.  Do breathing exercises. For example:  From a standing position, bend forward from the waist with your knees slightly bent. Let your arms dangle close to the floor.  Breathe in slowly and deeply as you return to a standing position. Roll up slowly and lift your head last.  Hold your breath for just a few seconds in the standing position.  Breathe out slowly and bend forward from the waist.  Let your feelings out. Talk, laugh, cry, and express anger when you need to. Talking with supportive friends or family, a counselor, or a divya leader about your feelings is a healthy way to relieve stress. Avoid discussing your feelings with people who make you feel worse.  Write. It may help to write about things that are bothering you. This helps you find out how much stress you feel and what is causing it. When you know this, you can find better ways to cope.  What can you do to prevent stress?  You might try some of these things to help prevent stress:  Manage your time. This helps you find time to do the things you want and need to do.  Get enough sleep. Your body recovers from the stresses of the day while you are sleeping.  Get support. Your family, friends, and community can make a difference in how you experience stress.  Limit your news feed. Avoid or limit time on social media or news that may make you feel stressed.  Do something active. Exercise or activity can help reduce stress. Walking is a great way to get started.  Where can you learn more?  Go  "to https://www.[employer].net/patiented  Enter N032 in the search box to learn more about \"Learning About Stress.\"  Current as of: October 24, 2023               Content Version: 14.0    7817-1916 [employer].   Care instructions adapted under license by your healthcare professional. If you have questions about a medical condition or this instruction, always ask your healthcare professional. [employer] disclaims any warranty or liability for your use of this information.      "

## 2024-05-09 NOTE — PROGRESS NOTES
Preventive Care Visit  [facility]  Maura Sun MD, Family Medicine  May 9, 2024      Assessment & Plan     Routine general medical examination at a health care facility  Reviewed/updated Health Maintenance     Cervical high risk HPV (human papillomavirus) test positive  - Pap diagnostic with HPV    Mild recurrent major depression (H24)  Start venlafaxine 37.5 mg daily for 2 weeks then increase to 75 mg daily.    - venlafaxine (EFFEXOR XR) 37.5 MG 24 hr capsule  Dispense: 14 capsule; Refill: 0  - venlafaxine (EFFEXOR XR) 75 MG 24 hr capsule  Dispense: 90 capsule; Refill: 0    Screening examination for STI  - NEISSERIA GONORRHOEA PCR  - CHLAMYDIA TRACHOMATIS PCR  - Treponema Abs w Reflex to RPR and Titer  - HIV Antigen Antibody Combo  - Hepatitis C Screen Reflex to HCV RNA Quant and Genotype    General counseling and advice on female contraception  She had not been using contraceptives due to wanting to conceive.  Now that she is no longer with that partner we discussed contraceptive options for the future including oral contraceptive pills and IUD.    Vitamin D deficiency  She has been taking 5,000 units Vit D for the past 2 years.  - Vitamin D Deficiency      Counseling  Appropriate preventive services were discussed with this patient, including applicable screening as appropriate for fall prevention, nutrition, physical activity, Tobacco-use cessation, weight loss and cognition.  Checklist reviewing preventive services available has been given to the patient.  Reviewed patient's diet, addressing concerns and/or questions.   The patient's PHQ-9 score is consistent with mild depression. She was provided with information regarding depression.       See Patient Instructions     Follow-up Visit   Expected date:  Jul 09, 2024 (Approximate)      Follow Up Appointment Details:     Follow-up with whom?: Me    Follow-Up for what?: Chronic Disease f/u    Chronic Disease f/u:   Depression  Anxiety       How?: In Person or Virtual             Follow-up Visit   Expected date:  May 09, 2025 (Approximate)      Follow Up Appointment Details:     Follow-up with whom?: PCP    Follow-Up for what?: Adult Preventive    How?: In Person                      Subjective   Shanta is a 33 year old, presenting for the following:  Physical        5/9/2024    11:44 AM   Additional Questions   Roomed by JASMYNE RN        Health Care Directive  Patient does not have a Health Care Directive or Living Will: Discussed advance care planning with patient; information given to patient to review.    HPI    Worsening mood.  Previously she has struggled more with anxiety.  Now anxiety is better but mood worse.  Ended a long relationship and feels that has improved her anxiety.   Tried sertraline a few years ago which caused worsening anxiety.  Brother is on venlafaxine (Effexor) and it is working well for him.  She wonders about trying it.      4/13/2022    11:17 AM 2/8/2023     1:06 PM 5/9/2024    11:34 AM   PHQ   PHQ-9 Total Score 10 6 9   Q9: Thoughts of better off dead/self-harm past 2 weeks Not at all Not at all Not at all          1/19/2022    12:03 PM 4/13/2022    11:17 AM 2/8/2023     1:06 PM   BERTHA-7 SCORE   Total Score 12 (moderate anxiety)  7 (mild anxiety)   Total Score 12 17 7            5/9/2024    11:34 AM   Last PHQ-9   1.  Little interest or pleasure in doing things 1   2.  Feeling down, depressed, or hopeless 1   3.  Trouble falling or staying asleep, or sleeping too much 2   4.  Feeling tired or having little energy 2   5.  Poor appetite or overeating 1   6.  Feeling bad about yourself 1   7.  Trouble concentrating 1   8.  Moving slowly or restless 0   Q9: Thoughts of better off dead/self-harm past 2 weeks 0   PHQ-9 Total Score 9           5/9/2024   General Health   How would you rate your overall physical health? Good   Feel stress (tense, anxious, or unable to sleep) To some extent   (!) STRESS  CONCERN      5/9/2024   Nutrition   Three or more servings of calcium each day? (!) I DON'T KNOW   Diet: Regular (no restrictions)   How many servings of fruit and vegetables per day? (!) I DON'T KNOW   How many sweetened beverages each day? 0-1         5/9/2024   Exercise   Days per week of moderate/strenous exercise 6 days         5/9/2024   Social Factors   Frequency of gathering with friends or relatives Twice a week   Worry food won't last until get money to buy more No   Food not last or not have enough money for food? No   Do you have housing?  Yes   Are you worried about losing your housing? No   Lack of transportation? No   Unable to get utilities (heat,electricity)? No         5/9/2024   Dental   Dentist two times every year? Yes         5/9/2024   TB Screening   Were you born outside of the [place]? No       Today's PHQ-9 Score:       5/9/2024    11:34 AM   PHQ-9 SCORE   PHQ-9 Total Score MyChart 9 (Mild depression)   PHQ-9 Total Score 9         5/9/2024   Substance Use   Alcohol more than 3/day or more than 7/wk No   Do you use any other substances recreationally? (!) CANNABIS PRODUCTS     Social History     Tobacco Use    Smoking status: Former     Types: Cigarettes    Smokeless tobacco: Never    Tobacco comments:     1 cig a month   Vaping Use    Vaping status: Former    Substances: THC, CBD    Devices: Disposable   Substance Use Topics    Alcohol use: Yes     Alcohol/week: 0.0 standard drinks of alcohol     Comment: once a year     Drug use: No          Mammogram Screening - Patient under 40 years of age: Routine Mammogram Screening not recommended.         5/9/2024   STI Screening   New sexual partner(s) since last STI/HIV test? No     History of abnormal Pap smear: YES - updated in Problem List and Health Maintenance accordingly        Latest Ref Rng & Units 2/8/2023     1:31 PM   PAP / HPV   PAP  Negative for Intraepithelial Lesion or Malignancy (NILM)    HPV 16 DNA Negative Negative    HPV 18 DNA  "Negative Negative    Other HR HPV Negative Positive            5/9/2024   Contraception/Family Planning   Questions about contraception or family planning No        Reviewed and updated as needed this visit by Provider   Tobacco  Allergies  Meds  Problems  Med Hx  Surg Hx  Fam Hx            BP Readings from Last 3 Encounters:   05/09/24 98/70   02/08/23 97/65   11/13/21 102/72    Wt Readings from Last 3 Encounters:   05/09/24 46.8 kg (103 lb 3.2 oz)   02/08/23 44.2 kg (97 lb 6.4 oz)   01/20/22 44.5 kg (98 lb)                  Patient Active Problem List   Diagnosis    Gastroesophageal reflux disease without esophagitis    Restless leg syndrome    Unintentional weight loss    BERTHA (generalized anxiety disorder)    Vitamin D deficiency    Tobacco abuse    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    Cervical high risk HPV (human papillomavirus) test positive     Past Surgical History:   Procedure Laterality Date    NO HISTORY OF SURGERY         Social History     Tobacco Use    Smoking status: Former     Types: Cigarettes    Smokeless tobacco: Never    Tobacco comments:     1 cig a month   Substance Use Topics    Alcohol use: Yes     Alcohol/week: 0.0 standard drinks of alcohol     Comment: once a year      Family History   Problem Relation Age of Onset    Migraines Mother     Diabetes Mother         prediabetes    Diabetes Father     Lipids Father     Hypertension Maternal Grandmother     No Known Problems Paternal Grandmother     Diabetes Paternal Grandfather     Cancer No family hx of     Cerebrovascular Disease No family hx of     Myocardial Infarction No family hx of                 Objective    Exam  BP 98/70 (BP Location: Right arm, Patient Position: Sitting, Cuff Size: Adult Regular)   Pulse 80   Temp 98  F (36.7  C) (Temporal)   Resp 16   Ht 1.573 m (5' 1.93\")   Wt 46.8 kg (103 lb 3.2 oz)   LMP 04/30/2024 (Approximate)   SpO2 97%   BMI 18.92 kg/m     Estimated body " "mass index is 18.92 kg/m  as calculated from the following:    Height as of this encounter: 1.573 m (5' 1.93\").    Weight as of this encounter: 46.8 kg (103 lb 3.2 oz).    Physical Exam  GENERAL: healthy, alert and no distress  EYES: Eyes grossly normal to inspection, conjunctivae and sclerae normal  HENT: ear canals and TM's normal  NECK: no adenopathy, no asymmetry, masses, or scars and thyroid normal to palpation  RESP: lungs clear to auscultation - no rales, rhonchi or wheezes  CV: regular rate and rhythm, normal S1 S2, no S3 or S4, no murmur, click or rub  ABDOMEN: soft, nontender, no hepatosplenomegaly, no masses  :  vulva, vagina, and cervix are normal in appearance and pap smear was obtained   MS: no gross musculoskeletal defects noted, no edema  SKIN: no suspicious lesions or rashes  NEURO: Grossly normal strength and tone, mentation intact and speech normal  PSYCH: mentation appears normal, affect normal/bright      Signed Electronically by: Maura Sun MD        "

## 2024-05-10 LAB
C TRACH DNA SPEC QL NAA+PROBE: NEGATIVE
HCV AB SERPL QL IA: NONREACTIVE
HIV 1+2 AB+HIV1 P24 AG SERPL QL IA: NONREACTIVE
N GONORRHOEA DNA SPEC QL NAA+PROBE: NEGATIVE
VIT D+METAB SERPL-MCNC: 31 NG/ML (ref 20–50)

## 2024-05-10 NOTE — RESULT ENCOUNTER NOTE
Kameron Womack,  Your Vitamin D level is perfect so keep taking your current dose.      Your sexually transmitted infection screening tests are all negative. Consistent use of condoms helps to decrease your risk for sexually transmitted infections.      Maura Sun MD

## 2024-05-13 LAB
BKR LAB AP GYN ADEQUACY: NORMAL
BKR LAB AP GYN INTERPRETATION: NORMAL
BKR LAB AP HPV REFLEX: NORMAL
BKR LAB AP LMP: NORMAL
BKR LAB AP PREVIOUS ABNL DX: NORMAL
BKR LAB AP PREVIOUS ABNORMAL: NORMAL
PATH REPORT.COMMENTS IMP SPEC: NORMAL
PATH REPORT.COMMENTS IMP SPEC: NORMAL
PATH REPORT.RELEVANT HX SPEC: NORMAL

## 2024-05-19 ENCOUNTER — MYC MEDICAL ADVICE (OUTPATIENT)
Dept: FAMILY MEDICINE | Facility: CLINIC | Age: 34
End: 2024-05-19
Payer: COMMERCIAL

## 2024-05-19 DIAGNOSIS — F33.0 MILD RECURRENT MAJOR DEPRESSION (H): ICD-10-CM

## 2024-05-20 ENCOUNTER — PATIENT OUTREACH (OUTPATIENT)
Dept: FAMILY MEDICINE | Facility: CLINIC | Age: 34
End: 2024-05-20
Payer: COMMERCIAL

## 2024-05-20 LAB
HUMAN PAPILLOMA VIRUS 16 DNA: NEGATIVE
HUMAN PAPILLOMA VIRUS 18 DNA: NEGATIVE
HUMAN PAPILLOMA VIRUS FINAL DIAGNOSIS: NORMAL
HUMAN PAPILLOMA VIRUS OTHER HR: NEGATIVE

## 2024-05-20 NOTE — TELEPHONE ENCOUNTER
Patient requests she continue the 37.5 mg dose of venlafaxine as she continues to adjust to it.  Refill pended for review. Thank you.    Hilda Silver RN, BSN, PHN  [facility]

## 2024-05-21 RX ORDER — VENLAFAXINE HYDROCHLORIDE 37.5 MG/1
37.5 CAPSULE, EXTENDED RELEASE ORAL DAILY
Qty: 30 CAPSULE | Refills: 0 | Status: SHIPPED | OUTPATIENT
Start: 2024-05-21 | End: 2024-06-24 | Stop reason: DRUGHIGH

## 2024-05-24 NOTE — TELEPHONE ENCOUNTER
The [facility] [place] message sent to patient.  Victorina SHELTON BSN, PHN, RN  [facility]  [phone]

## 2024-06-21 ENCOUNTER — NURSE TRIAGE (OUTPATIENT)
Dept: NURSING | Facility: CLINIC | Age: 34
End: 2024-06-21
Payer: COMMERCIAL

## 2024-06-21 NOTE — TELEPHONE ENCOUNTER
[place]. Patient tested positive for Covid yesterday.     COVID Positive/Requesting COVID treatment    Patient is positive for COVID and requesting treatment options.    Date of positive COVID test (PCR or at home)? 6/20/2024  Current COVID symptoms: fever or chills, cough, muscle or body aches, headache, and congestion or runny nose  Date COVID symptoms began: 6/18/2024    Patient reporting chest pressure on the right side only. This comes and goes. Patient rating 4/10. Patient does not have a thermometer but has been having chills and feels like she may be running a fever. Patient has been treating body aches with tylenol. Patient also having ear pain on the left side. When patient yawns she feels it in both but the left side is mainly where there is pain.    Patient denies difficulty breathing. Ear pain is her main concern. Denies chest pressure at time of call.     Protocol recommends ED or PCP triage.   Page to on call Dr. German Mcclain at 5:59 pm  Dr. Mcclain returning page at 6:24 pm. Provider is recommending patient be seen in the ED due to concern for pericarditis.   Provider Recommendation Follow Up:   Reached patient/caregiver. Informed of provider's recommendations. Patient verbalized understanding and agrees with the plan.   Patient will present to [facility] ED.   Dian Villaseñor RN   06/21/24 6:29 PM  [facility] Nurse Advisor              Reason for Disposition   Chest pain or pressure  (Exception: MILD central chest pain, present only when coughing.)    Additional Information   Negative: SEVERE difficulty breathing (e.g., struggling for each breath, speaks in single words)   Negative: Difficult to awaken or acting confused (e.g., disoriented, slurred speech)   Negative: Bluish (or gray) lips or face now   Negative: Shock suspected (e.g., cold/pale/clammy skin, too weak to stand, low BP, rapid pulse)   Negative: Sounds like a life-threatening emergency to  the triager   Negative: [1] Diagnosed or suspected COVID-19 AND [2] symptoms lasting 3 or more weeks   Negative: [1] COVID-19 exposure AND [2] no symptoms   Negative: COVID-19 vaccine reaction suspected (e.g., fever, headache, muscle aches) occurring 1 to 3 days after getting vaccine   Negative: COVID-19 vaccine, questions about   Negative: [1] Lives with someone known to have influenza (flu test positive) AND [2] flu-like symptoms (e.g., cough, runny nose, sore throat, SOB; with or without fever)   Negative: [1] Possible COVID-19 symptoms AND [2] triager concerned about severity of symptoms or other causes   Negative: COVID-19 and breastfeeding, questions about   Negative: SEVERE or constant chest pain or pressure  (Exception: Mild central chest pain, present only when coughing.)   Negative: MODERATE difficulty breathing (e.g., speaks in phrases, SOB even at rest, pulse 100-120)   Negative: [1] Headache AND [2] stiff neck (can't touch chin to chest)   Negative: Oxygen level (e.g., pulse oximetry) 90 percent or lower    Protocols used: Coronavirus (COVID-19) Diagnosed or [id number]

## 2024-06-24 DIAGNOSIS — F33.0 MILD RECURRENT MAJOR DEPRESSION (H): ICD-10-CM

## 2024-06-24 RX ORDER — VENLAFAXINE HYDROCHLORIDE 37.5 MG/1
37.5 CAPSULE, EXTENDED RELEASE ORAL DAILY
Qty: 30 CAPSULE | Refills: 0 | OUTPATIENT
Start: 2024-06-24

## 2024-06-24 NOTE — TELEPHONE ENCOUNTER
I don't know if the patient or the pharmacy requested the RF on the Effexor 37.5 mg dose but, per our visit last month, the plan was to take 37.5 mg daily for one month then increase to the 75 mg daily dose and that Rx is already on file at pharmacy.    Please call and review plan with patient.    Maura Sun MD  [facility]

## 2024-06-25 NOTE — TELEPHONE ENCOUNTER
Left message to call back and ask to speak with an available triage nurse.    AUBRIE StroudN, RN-BC  [facility]

## 2024-06-28 NOTE — TELEPHONE ENCOUNTER
Writer called and left message on patient's voicemail to call back and speak with a triage nurse.    Writer responded via [employer].    Simin Cho, AUBRIEN RN  [facility]

## 2024-08-06 DIAGNOSIS — F33.0 MILD RECURRENT MAJOR DEPRESSION (H): ICD-10-CM

## 2024-08-06 RX ORDER — VENLAFAXINE HYDROCHLORIDE 75 MG/1
75 CAPSULE, EXTENDED RELEASE ORAL DAILY
Qty: 90 CAPSULE | Refills: 0 | Status: SHIPPED | OUTPATIENT
Start: 2024-08-06

## 2024-08-08 ENCOUNTER — MYC REFILL (OUTPATIENT)
Dept: FAMILY MEDICINE | Facility: CLINIC | Age: 34
End: 2024-08-08
Payer: COMMERCIAL

## 2024-08-08 DIAGNOSIS — F33.0 MILD RECURRENT MAJOR DEPRESSION (H): ICD-10-CM

## 2024-08-08 RX ORDER — VENLAFAXINE HYDROCHLORIDE 75 MG/1
75 CAPSULE, EXTENDED RELEASE ORAL DAILY
Qty: 90 CAPSULE | Refills: 0 | OUTPATIENT
Start: 2024-08-08

## 2024-08-08 NOTE — TELEPHONE ENCOUNTER
Pharmacy requested refills that are already active on file. Refused request to pharmacy.  
Physical Exam:    I have reviewed the triage vital signs.    Gen: NAD, AOx3, non-toxic appearing, able to ambulate without assistance  Head and Neck: NCAT, Neck supple without meningismus   HEENT: EOMI, PEERLA, normal conjunctiva, tongue midline, oral mucosa moist  Lung: CTAB, no respiratory distress, no wheezes/rhonchi/rales B/L, speaking in full sentences  CV: RRR, no murmurs, rubs or gallops  Abd: soft, NT, ND, no guarding, no rigidity, no rebound tenderness, no CVA tenderness, no masses.   MSK: no gross deformities, ROM normal in UE/LE, no back tenderness  Neuro: CNs II-XII grossly intact. No focal sensory or motor deficits  Skin: Warm, well perfused, no rash, no leg swelling  Psych: Appropriate mood and affect

## 2024-11-01 ENCOUNTER — MYC MEDICAL ADVICE (OUTPATIENT)
Dept: FAMILY MEDICINE | Facility: CLINIC | Age: 34
End: 2024-11-01
Payer: COMMERCIAL

## 2024-11-01 ENCOUNTER — MYC REFILL (OUTPATIENT)
Dept: FAMILY MEDICINE | Facility: CLINIC | Age: 34
End: 2024-11-01
Payer: COMMERCIAL

## 2024-11-01 DIAGNOSIS — F33.0 MILD RECURRENT MAJOR DEPRESSION (H): ICD-10-CM

## 2024-11-01 RX ORDER — VENLAFAXINE HYDROCHLORIDE 75 MG/1
75 CAPSULE, EXTENDED RELEASE ORAL DAILY
Qty: 90 CAPSULE | Refills: 0 | OUTPATIENT
Start: 2024-11-01

## 2024-11-01 RX ORDER — VENLAFAXINE HYDROCHLORIDE 75 MG/1
75 CAPSULE, EXTENDED RELEASE ORAL DAILY
Qty: 90 CAPSULE | Refills: 1 | Status: SHIPPED | OUTPATIENT
Start: 2024-11-01

## 2025-03-15 ENCOUNTER — LAB (OUTPATIENT)
Dept: LAB | Facility: CLINIC | Age: 35
End: 2025-03-15
Payer: COMMERCIAL

## 2025-03-15 DIAGNOSIS — N92.6 IRREGULAR MENSES: ICD-10-CM

## 2025-03-15 LAB
ERYTHROCYTE [DISTWIDTH] IN BLOOD BY AUTOMATED COUNT: 13 % (ref 10–15)
EST. AVERAGE GLUCOSE BLD GHB EST-MCNC: 108 MG/DL
HBA1C MFR BLD: 5.4 % (ref 0–5.6)
HCT VFR BLD AUTO: 41.2 % (ref 35–47)
HGB BLD-MCNC: 13.4 G/DL (ref 11.7–15.7)
MCH RBC QN AUTO: 30.3 PG (ref 26.5–33)
MCHC RBC AUTO-ENTMCNC: 32.5 G/DL (ref 31.5–36.5)
MCV RBC AUTO: 93 FL (ref 78–100)
PLATELET # BLD AUTO: 404 10E3/UL (ref 150–450)
RBC # BLD AUTO: 4.42 10E6/UL (ref 3.8–5.2)
WBC # BLD AUTO: 4.9 10E3/UL (ref 4–11)

## 2025-03-15 PROCEDURE — 83036 HEMOGLOBIN GLYCOSYLATED A1C: CPT

## 2025-03-15 PROCEDURE — 84443 ASSAY THYROID STIM HORMONE: CPT

## 2025-03-15 PROCEDURE — 84146 ASSAY OF PROLACTIN: CPT

## 2025-03-15 PROCEDURE — 85027 COMPLETE CBC AUTOMATED: CPT

## 2025-03-15 PROCEDURE — 83001 ASSAY OF GONADOTROPIN (FSH): CPT

## 2025-03-15 PROCEDURE — 36415 COLL VENOUS BLD VENIPUNCTURE: CPT

## 2025-03-15 PROCEDURE — 82670 ASSAY OF TOTAL ESTRADIOL: CPT

## 2025-03-15 PROCEDURE — 82166 ASSAY ANTI-MULLERIAN HORM: CPT

## 2025-03-16 LAB
ESTRADIOL SERPL-MCNC: 31 PG/ML
FSH SERPL IRP2-ACNC: 5.4 MIU/ML
MIS SERPL-MCNC: 3.65 NG/ML (ref 0.58–8.1)
PROLACTIN SERPL 3RD IS-MCNC: 24 NG/ML (ref 5–23)
TSH SERPL DL<=0.005 MIU/L-ACNC: 0.93 UIU/ML (ref 0.3–4.2)

## 2025-03-19 ENCOUNTER — ANCILLARY PROCEDURE (OUTPATIENT)
Dept: ULTRASOUND IMAGING | Facility: CLINIC | Age: 35
End: 2025-03-19
Payer: COMMERCIAL

## 2025-03-19 ENCOUNTER — OFFICE VISIT (OUTPATIENT)
Dept: OBGYN | Facility: CLINIC | Age: 35
End: 2025-03-19
Payer: COMMERCIAL

## 2025-03-19 VITALS
HEART RATE: 81 BPM | BODY MASS INDEX: 19.62 KG/M2 | DIASTOLIC BLOOD PRESSURE: 71 MMHG | OXYGEN SATURATION: 98 % | SYSTOLIC BLOOD PRESSURE: 102 MMHG | WEIGHT: 107 LBS

## 2025-03-19 DIAGNOSIS — Z12.4 ENCOUNTER FOR PAPANICOLAOU SMEAR FOR CERVICAL CANCER SCREENING: ICD-10-CM

## 2025-03-19 DIAGNOSIS — Z86.19 HX OF HUMAN PAPILLOMAVIRUS INFECTION: ICD-10-CM

## 2025-03-19 DIAGNOSIS — N92.6 IRREGULAR MENSES: ICD-10-CM

## 2025-03-19 DIAGNOSIS — N97.0 ANOVULATORY CYCLE: ICD-10-CM

## 2025-03-19 DIAGNOSIS — Z31.69 PRE-CONCEPTION COUNSELING: Primary | ICD-10-CM

## 2025-03-19 PROCEDURE — 76830 TRANSVAGINAL US NON-OB: CPT | Performed by: OBSTETRICS & GYNECOLOGY

## 2025-03-19 PROCEDURE — 76856 US EXAM PELVIC COMPLETE: CPT | Performed by: OBSTETRICS & GYNECOLOGY

## 2025-03-19 RX ORDER — CLOMIPHENE CITRATE 50 MG/1
TABLET ORAL
Qty: 5 TABLET | Refills: 4 | Status: SHIPPED | OUTPATIENT
Start: 2025-03-19

## 2025-03-19 NOTE — PROGRESS NOTES
CC: US/lab review, pap smear  S: Shanta is a 35 yo  who is here for US review, lab review and pap smear screen  -periods continuing to remain regular  -opk did not indicate ovulation this most recent cycle- first time using opk  -hx hpv on pap- will update today has had hpv vaccination  -partner stopped drinking- still encouraging him to make pcp apt and work on his health  -is focusing on her health: goals to make healthier dietary choices, does classes 1 hr 3x weekly, walks dog daily    O:/71   Pulse 81   Wt 48.5 kg (107 lb)   LMP 2025 (Exact Date)   SpO2 98%   BMI 19.62 kg/m    Past Medical History:   Diagnosis Date    History of chicken pox      Past Surgical History:   Procedure Laterality Date    NO HISTORY OF SURGERY       Current Outpatient Medications   Medication Sig Dispense Refill    clomiPHENE (CLOMID) 50 MG tablet Days 3-7 of cycle 5 tablet 4    Prenatal MV-Min-Fe Fum-FA-DHA (PRENATAL/FOLIC ACID+DHA) 27-0.8-200 MG CAPS Take by mouth.      venlafaxine (EFFEXOR XR) 75 MG 24 hr capsule TAKE 1 CAPSULE(75 MG) BY MOUTH DAILY 90 capsule 1    cholecalciferol (VITAMIN D3) 125 mcg (5000 units) capsule Take 1 capsule (125 mcg) by mouth daily (Patient not taking: Reported on 3/19/2025) 100 capsule PRN    magnesium glycinate 100 MG CAPS capsule Take 200 mg by mouth daily (Patient not taking: Reported on 3/19/2025)       No current facility-administered medications for this visit.      No Known Allergies  Alert pleasant well appearing female  NAD  RRR  Normal bp and pulse   Pelvic Exam:  Vulva: No external lesions, normal hair distribution, no adenopathy  Vagina: Moist, pink, no abnormal discharge, well rugated, no lesions  Cervix: Pap smear is taken, nuliparous, smooth, pink, no visible lesions    Component      Latest Ref Rng 3/15/2025  1:04 PM   WBC      4.0 - 11.0 10e3/uL 4.9    RBC Count      3.80 - 5.20 10e6/uL 4.42    Hemoglobin      11.7 - 15.7 g/dL 13.4    Hematocrit      35.0 - 47.0  % 41.2    MCV      78 - 100 fL 93    MCH      26.5 - 33.0 pg 30.3    MCHC      31.5 - 36.5 g/dL 32.5    RDW      10.0 - 15.0 % 13.0    Platelet Count      150 - 450 10e3/uL 404    Estimated Average Glucose      <117 mg/dL 108    Hemoglobin A1C      0.0 - 5.6 % 5.4    Estradiol      pg/mL 31    FSH      mIU/mL 5.4    Anti-Mullerian Hormone      0.580 - 8.100 ng/mL 3.650    Prolactin      5 - 23 ng/mL 24 (H)    TSH      0.30 - 4.20 uIU/mL 0.93       Legend:  (H) High      Gynecological Ultrasound Report  Pelvic U/S - Transabdominal and Transvaginal   [facility] Obstetrics and Gynecology  Referring Provider: JOAO Saenz CNP  Sonographer:  Barry Pringle RDMS  Indication: Bleeding/Menses- Metrorrhagia (irregular menses) and Postcoital Bleeding  LMP: 2025     Gynecological Ultrasonography:   Uterus: anteflexed. Contour is smooth/regular.  Size: 7.2 x 4.2 x 3.3 cm  Endometrium: Thickness Total 4.4 mm     Right Ovary: 1.4 x 2.0 x 3.0 cm. Wnl  Left Ovary: 2.6 x 1.0 x 1.3 cm. Wnl  Cul de Sac Free Fluid: Trace     Technique: Transvaginal Imaging performed  Transabdominal Imaging performed  3D imaging performed     Impression:      The uterus and ovaries were visualized and no abnormalities were seen.     Imani Adler MD          A/P:  Shanta is a 35 yo  who is here for US review, lab review and pap smear screen    1. Pre-conception counseling (Primary)  -continues to have regular period  -opk did not indicate ovulation- discussed she could be having anovulatory cycles- discussed multiple etiologies, she does not fit [place] criteria, she is not under eating/over exercising  -both her and her partner working on healthy lifestyle- she is going to meet with RD to discuss nutrition goals   -reviewed labs WNL- unexplained inability to conceive after 1 year we recc seeing ZULAY- which she is at this point- gave info for self referral  -discussed with anovulatory cycles can try  clomid/letrazole, explained, risks, benefit, SE- gave instructions how to dose- discussed after 3-4 attempts and no conception ZULAY  -US WNL  -consider HSG/ partner SA- she is going to talk with her partner to determine next steps- has clomid and instructions and has instructions how to book with ZULAY    2. Hx of human papillomavirus infection  -has had hpv vacc  - HPV and Gynecologic Cytology Panel - Recommended Age 30-65 Years    3. Encounter for Papanicolaou smear for cervical cancer screening  - HPV and Gynecologic Cytology Panel - Recommended Age 30-65 Years    4. Anovulatory cycle  -call if achieving pregnancy  - clomiPHENE (CLOMID) 50 MG tablet; Days 3-7 of cycle  Dispense: 5 tablet; Refill: 4      Rtc annually or Prn with concerns, call if having pregnancy  JOAO Saenz CNP

## 2025-03-19 NOTE — PATIENT INSTRUCTIONS
"Kameron Womack,   Instructions for Clomid    1. Have a period  - Your first of bleeding (real bleeding, not just spotting) is \"Day One\"  - If you are not having periods on a regular basis on your own call us    2. Clomid  - Take clomid on days 3-7 (day 1 is first day of bleeding, not spotting)    3. Check for ovulation  - Ovulation predictor kits helps us know when in cycle you ovulate, start around day 10  - Come in to the lab for a blood draw on day 21 (progesterone). If elevated, this confirms ovulation- this is optional not mandatory- continue using your home testing.  - Let me know when day 21 will be, so we can plan the lab draw if it falls on a weekend (usually do it the following Monday)    4. [place]  - Start day 11  - EVERY OTHER DAY to ensure a high concentration of sperm    5. Check for pregnancy  - If you ovulated, you'll either get a period, or you'll be pregnant.  - If you don't get a period, take a pregnancy test two weeks after your progesterone level (day 35)    Send me a Zyncd message or call with any questions.     Usually I recommend 3-4 cycles of ovulation induction medication, but if no success after 3-4 cycles, I recommend seeing a reproductive endocrinologist and infertility specialist. I can provide a list of local clinics at your request.     Potential side effects of clomid include: hot flushes, mood swings, headaches, bloating, breast tenderness and ovarian enlargement. There is an increased risk of multiples with clomid (twins have been reported in 7-9%of pregnancies, triplets in 0.3-0.5%). If you experience any visual changes while taking clomid, please let me know and stop the medication.     [facility]  Professional Building  [address]  [place] [address] [phone]      Here is the information for the fertility clinics we discussed:     [facility]   [email or web address] "     [place] location:  [facility]  [address], [address]  [place] [address]  [phone]     [place] location:  [facility] Building  [address], [address]  [place] [id number] [phone]  __________________________________________________    [facility]  [address] [place]  [phone]  __________________________________________________    [facility]  [email or web address]    [place] Location:  [address], [address]  [place] [address]     [place] Location:  [address], [address]  [place] [address] [phone]

## 2025-03-25 LAB
BKR AP ASSOCIATED HPV REPORT: ABNORMAL
BKR LAB AP GYN ADEQUACY: ABNORMAL
BKR LAB AP GYN INTERPRETATION: ABNORMAL
BKR LAB AP LMP: ABNORMAL
BKR LAB AP PREVIOUS ABNL DX: ABNORMAL
BKR LAB AP PREVIOUS ABNORMAL: ABNORMAL
PATH REPORT.COMMENTS IMP SPEC: ABNORMAL
PATH REPORT.COMMENTS IMP SPEC: ABNORMAL
PATH REPORT.RELEVANT HX SPEC: ABNORMAL

## 2025-03-26 ENCOUNTER — PATIENT OUTREACH (OUTPATIENT)
Dept: OBGYN | Facility: CLINIC | Age: 35
End: 2025-03-26
Payer: COMMERCIAL

## 2025-04-09 ENCOUNTER — PATIENT OUTREACH (OUTPATIENT)
Dept: CARE COORDINATION | Facility: CLINIC | Age: 35
End: 2025-04-09
Payer: COMMERCIAL

## 2025-04-23 ENCOUNTER — PATIENT OUTREACH (OUTPATIENT)
Dept: CARE COORDINATION | Facility: CLINIC | Age: 35
End: 2025-04-23
Payer: COMMERCIAL

## 2025-05-05 NOTE — PROGRESS NOTES
Shanta Edmondson is a 34 year old female  who presents for initial colposcopy, referred by Mariaelena Joy. Pap smear *** months ago showed: 3/19/2025, pap, ASCUS, +HR HPV, (not 16/18). The prior pap showed 3/19/2025, pap, ASCUS, +HR HPV, (not 16/18).     Patient's last menstrual period was 2025 (exact date).  UPT today is negative  Patient does not smoke  Type of contraception: none  Age at first sexual intercourse: 26  Number of sexual partners (lifetime): less then 6  Past GYN history:  No STD history and HPV  Prior cervical/vaginal disease: {:733}.  Prior cervical treatment: {:067687}.      PROCEDURE:  Before the procedure, it was ensured that the patient was educated regarding the nature of her findings to date, the implications, and what was to be done. She has been made aware of the role of HPV, the natural history of infection, ways to minimize her future risk, the effect of HPV on the cervix, and treatment options available should they be indicated.  The details of the   colposcopic procedure were reviewed.  All questions were answered before proceeding, and informed consent was therefore obtained.    Speculum placed in vagina and excellent visualization of cervix   acheived, cervix swabbed x 3 with acetic acid solution.    FINDINGS:  Cervix: {:454470}  Please refer to images section for details.    Pap repeated?:  {:659057}  SCJ seen?:  {:179346}    ECC done?:  {:682879}  Lugol's solution used?:  {:072327}  Satisfactory examination?:  {:453750}      ASSESSMENT: {:733}.    PLAN: {:356176}     MD

## 2025-05-05 NOTE — PATIENT INSTRUCTIONS

## 2025-05-08 ENCOUNTER — RESULTS FOLLOW-UP (OUTPATIENT)
Dept: OBGYN | Facility: CLINIC | Age: 35
End: 2025-05-08

## 2025-05-08 ENCOUNTER — OFFICE VISIT (OUTPATIENT)
Dept: OBGYN | Facility: CLINIC | Age: 35
End: 2025-05-08
Payer: COMMERCIAL

## 2025-05-08 VITALS
OXYGEN SATURATION: 99 % | HEIGHT: 61 IN | DIASTOLIC BLOOD PRESSURE: 72 MMHG | WEIGHT: 108.3 LBS | HEART RATE: 106 BPM | SYSTOLIC BLOOD PRESSURE: 110 MMHG | BODY MASS INDEX: 20.45 KG/M2

## 2025-05-08 DIAGNOSIS — R87.610 ASCUS WITH POSITIVE HIGH RISK HPV CERVICAL: Primary | ICD-10-CM

## 2025-05-08 DIAGNOSIS — R87.810 ASCUS WITH POSITIVE HIGH RISK HPV CERVICAL: Primary | ICD-10-CM

## 2025-05-08 LAB — HCG UR QL: NEGATIVE

## 2025-05-08 NOTE — PROGRESS NOTES
GYN Procedure Note   Office Colposcopy     CC: Evaluation of a ASCUS with +HR HPV pap smear.    HPI:  HPV vaccination: Completed                                                              Smoking history: non-smoker   Patient's pap smear history is as follows:  2/8/23 NIL, +HR HPV, not 16/18. Plan 1 yr co-test / notified  5/9/24 NIL, Neg HPV. Plan 1 yr co-test  3/19/25 ASCUS, +HR HPV, not 16/18. Plan [place] due bef 06/19/25      Urine HCG: Neg    PROCEDURE NOTE:    We discussed that based on her pap testing we recommend further evaluation. She understands our recommendation to proceed with colposcopy. We discussed that colposcopy allows further evaluation of the cervix using magnification. I counseled the patient that biopsies may also be done at the time of the colposcopy. I discussed the procedure of colposcopy and counseled her that the colposcope magnifies the appearance of the cervix. Acetic acid or vinegar is placed on the cervix and vagina to stain the cells and to allow the clinician to better see where the abnormal cells are located and the size of any abnormal areas. The size, type and location of abnormal cells help to determine which area or areas may need to be biopsied. This information will further determine how severe the abnormality is and also help to determine what treatment, if any, is needed. When monitored and treated early, pre-cancerous areas usually do not develop into cervical cancer.       Written consent was obtained after [facility] management guidelines were discussed and all patient's questions were answered. Final verification with two patient identifiers was performed.       Colposcopic exam of the cervix was performed before and after application of 3% acetic acid.  Colposcopy was not adequate as the lesion extended into the cervical canal   Acetowhite lesions: faint acetowhite changes circumferential around the cervical os  Punctations: none  Mosaicism: none  Atypical vessels: none    2  biopsy(ies) taken.   Hemostasis obtained with silver nitrate.  ECC done with Kevorkian curette and endocervical brush. EBL minimal. Patient patient tolerated procedure well but then had a vagal reaction after completion of the procedure which resolved with repositioning.       Colposcopic impression: JOSE I        Assessment/Plan  -Evaluation of a ASCUS pap smear   -Biopsies and ECC obtained, will contact patient with results and follow up plan   -Recommend pelvic rest for 72 hours, discussed bleeding/infection    Dispo: pending results of biopsy     B[place] MD

## 2025-05-12 LAB
PATH REPORT.COMMENTS IMP SPEC: NORMAL
PATH REPORT.COMMENTS IMP SPEC: NORMAL
PATH REPORT.FINAL DX SPEC: NORMAL
PATH REPORT.GROSS SPEC: NORMAL
PATH REPORT.MICROSCOPIC SPEC OTHER STN: NORMAL
PATH REPORT.RELEVANT HX SPEC: NORMAL
PHOTO IMAGE: NORMAL

## 2025-05-15 ENCOUNTER — PATIENT OUTREACH (OUTPATIENT)
Dept: FAMILY MEDICINE | Facility: CLINIC | Age: 35
End: 2025-05-15
Payer: COMMERCIAL

## 2025-05-15 DIAGNOSIS — F33.0 MILD RECURRENT MAJOR DEPRESSION: ICD-10-CM

## 2025-05-15 RX ORDER — VENLAFAXINE HYDROCHLORIDE 75 MG/1
75 CAPSULE, EXTENDED RELEASE ORAL DAILY
Qty: 90 CAPSULE | Refills: 1 | Status: SHIPPED | OUTPATIENT
Start: 2025-05-15

## 2025-05-15 NOTE — TELEPHONE ENCOUNTER
Per patient she is completely out as of today, on calendar for July visit-please review and sign if agreeable thanks!